# Patient Record
Sex: MALE | Race: BLACK OR AFRICAN AMERICAN | Employment: OTHER | URBAN - METROPOLITAN AREA
[De-identification: names, ages, dates, MRNs, and addresses within clinical notes are randomized per-mention and may not be internally consistent; named-entity substitution may affect disease eponyms.]

---

## 2017-09-11 ENCOUNTER — OFFICE VISIT (OUTPATIENT)
Dept: INTERNAL MEDICINE CLINIC | Age: 77
End: 2017-09-11

## 2017-09-11 VITALS
SYSTOLIC BLOOD PRESSURE: 160 MMHG | HEART RATE: 72 BPM | BODY MASS INDEX: 30.2 KG/M2 | DIASTOLIC BLOOD PRESSURE: 108 MMHG | HEIGHT: 72 IN | WEIGHT: 223 LBS

## 2017-09-11 DIAGNOSIS — I10 ESSENTIAL HYPERTENSION: ICD-10-CM

## 2017-09-11 DIAGNOSIS — Z12.11 ENCOUNTER FOR SCREENING COLONOSCOPY: ICD-10-CM

## 2017-09-11 DIAGNOSIS — I10 ESSENTIAL HYPERTENSION: Primary | ICD-10-CM

## 2017-09-11 DIAGNOSIS — N40.0 BENIGN PROSTATIC HYPERPLASIA WITHOUT LOWER URINARY TRACT SYMPTOMS, UNSPECIFIED MORPHOLOGY: ICD-10-CM

## 2017-09-11 LAB
ANION GAP SERPL CALCULATED.3IONS-SCNC: 13 MMOL/L (ref 3–16)
BUN BLDV-MCNC: 11 MG/DL (ref 7–20)
CALCIUM SERPL-MCNC: 9.5 MG/DL (ref 8.3–10.6)
CHLORIDE BLD-SCNC: 99 MMOL/L (ref 99–110)
CHOLESTEROL, TOTAL: 201 MG/DL (ref 0–199)
CO2: 30 MMOL/L (ref 21–32)
CREAT SERPL-MCNC: 1 MG/DL (ref 0.8–1.3)
GFR AFRICAN AMERICAN: >60
GFR NON-AFRICAN AMERICAN: >60
GLUCOSE BLD-MCNC: 125 MG/DL (ref 70–99)
HCT VFR BLD CALC: 47.4 % (ref 40.5–52.5)
HDLC SERPL-MCNC: 61 MG/DL (ref 40–60)
HEMOGLOBIN: 15.5 G/DL (ref 13.5–17.5)
LDL CHOLESTEROL CALCULATED: 127 MG/DL
MCH RBC QN AUTO: 29.3 PG (ref 26–34)
MCHC RBC AUTO-ENTMCNC: 32.7 G/DL (ref 31–36)
MCV RBC AUTO: 89.5 FL (ref 80–100)
PDW BLD-RTO: 13.5 % (ref 12.4–15.4)
PLATELET # BLD: 193 K/UL (ref 135–450)
PMV BLD AUTO: 8.9 FL (ref 5–10.5)
POTASSIUM SERPL-SCNC: 4.6 MMOL/L (ref 3.5–5.1)
RBC # BLD: 5.29 M/UL (ref 4.2–5.9)
SODIUM BLD-SCNC: 142 MMOL/L (ref 136–145)
TRIGL SERPL-MCNC: 67 MG/DL (ref 0–150)
TSH REFLEX: 1.13 UIU/ML (ref 0.27–4.2)
VLDLC SERPL CALC-MCNC: 13 MG/DL
WBC # BLD: 3.9 K/UL (ref 4–11)

## 2017-09-11 PROCEDURE — 99203 OFFICE O/P NEW LOW 30 MIN: CPT | Performed by: INTERNAL MEDICINE

## 2017-09-11 RX ORDER — HYDROCHLOROTHIAZIDE 25 MG/1
25 TABLET ORAL DAILY
Qty: 30 TABLET | Refills: 3 | Status: SHIPPED | OUTPATIENT
Start: 2017-09-11 | End: 2018-01-25 | Stop reason: SDUPTHER

## 2017-09-11 ASSESSMENT — ENCOUNTER SYMPTOMS
CONSTIPATION: 0
SHORTNESS OF BREATH: 0
COUGH: 0
CHEST TIGHTNESS: 0
ABDOMINAL PAIN: 0
EYE PAIN: 0
VOMITING: 0
TROUBLE SWALLOWING: 0
COLOR CHANGE: 0
VOICE CHANGE: 0
EYE DISCHARGE: 0
NAUSEA: 0
WHEEZING: 0

## 2018-01-25 DIAGNOSIS — I10 ESSENTIAL HYPERTENSION: ICD-10-CM

## 2018-01-25 RX ORDER — HYDROCHLOROTHIAZIDE 25 MG/1
TABLET ORAL
Qty: 30 TABLET | Refills: 2 | Status: SHIPPED | OUTPATIENT
Start: 2018-01-25 | End: 2018-03-19 | Stop reason: DRUGHIGH

## 2018-03-19 ENCOUNTER — OFFICE VISIT (OUTPATIENT)
Dept: INTERNAL MEDICINE CLINIC | Age: 78
End: 2018-03-19

## 2018-03-19 VITALS
SYSTOLIC BLOOD PRESSURE: 146 MMHG | WEIGHT: 222 LBS | HEIGHT: 72 IN | HEART RATE: 72 BPM | DIASTOLIC BLOOD PRESSURE: 100 MMHG | BODY MASS INDEX: 30.07 KG/M2

## 2018-03-19 DIAGNOSIS — I10 ESSENTIAL HYPERTENSION: Primary | ICD-10-CM

## 2018-03-19 DIAGNOSIS — E78.5 DYSLIPIDEMIA: ICD-10-CM

## 2018-03-19 DIAGNOSIS — R73.9 HYPERGLYCEMIA: ICD-10-CM

## 2018-03-19 PROCEDURE — G8484 FLU IMMUNIZE NO ADMIN: HCPCS | Performed by: INTERNAL MEDICINE

## 2018-03-19 PROCEDURE — 1036F TOBACCO NON-USER: CPT | Performed by: INTERNAL MEDICINE

## 2018-03-19 PROCEDURE — 99213 OFFICE O/P EST LOW 20 MIN: CPT | Performed by: INTERNAL MEDICINE

## 2018-03-19 PROCEDURE — 4040F PNEUMOC VAC/ADMIN/RCVD: CPT | Performed by: INTERNAL MEDICINE

## 2018-03-19 PROCEDURE — G8427 DOCREV CUR MEDS BY ELIG CLIN: HCPCS | Performed by: INTERNAL MEDICINE

## 2018-03-19 PROCEDURE — 1123F ACP DISCUSS/DSCN MKR DOCD: CPT | Performed by: INTERNAL MEDICINE

## 2018-03-19 PROCEDURE — G8417 CALC BMI ABV UP PARAM F/U: HCPCS | Performed by: INTERNAL MEDICINE

## 2018-03-19 RX ORDER — LOSARTAN POTASSIUM AND HYDROCHLOROTHIAZIDE 12.5; 5 MG/1; MG/1
1 TABLET ORAL DAILY
Qty: 90 TABLET | Refills: 0 | Status: SHIPPED | OUTPATIENT
Start: 2018-03-19 | End: 2018-12-19 | Stop reason: DRUGHIGH

## 2018-03-19 ASSESSMENT — ENCOUNTER SYMPTOMS
NAUSEA: 0
VOMITING: 0
WHEEZING: 0
ABDOMINAL PAIN: 0
SHORTNESS OF BREATH: 0

## 2018-03-19 NOTE — PROGRESS NOTES
well-nourished. Eyes: Conjunctivae are normal. No scleral icterus. Cardiovascular: Normal rate, regular rhythm and normal heart sounds. Pulmonary/Chest: Effort normal and breath sounds normal. No respiratory distress. He has no wheezes. Musculoskeletal: He exhibits no edema. Neurological: He is alert and oriented to person, place, and time. Psychiatric: He has a normal mood and affect. Thought content normal.   Nursing note and vitals reviewed. CBC:   Lab Results   Component Value Date    WBC 3.9 09/11/2017    HGB 15.5 09/11/2017    HCT 47.4 09/11/2017     09/11/2017     CMP:   Lab Results   Component Value Date     09/11/2017    K 4.6 09/11/2017    CL 99 09/11/2017    CO2 30 09/11/2017    ANIONGAP 13 09/11/2017    GLUCOSE 125 09/11/2017    BUN 11 09/11/2017    CREATININE 1.0 09/11/2017    GFRAA >60 09/11/2017    CALCIUM 9.5 09/11/2017     URINALYSIS:   Lab Results   Component Value Date    GLUCOSEU NEGATIVE 06/15/2013    KETUA NEGATIVE 06/15/2013    SPECGRAV 1.015 06/15/2013    BLOODU NEGATIVE 06/15/2013    PHUR 6.5 06/15/2013    PROTEINU NEGATIVE 06/15/2013    NITRU NEGATIVE 06/15/2013    LEUKOCYTESUR NEGATIVE 06/15/2013   LIPID:   Lab Results   Component Value Date    CHOL 201 09/11/2017    TRIG 67 09/11/2017    HDL 61 09/11/2017    LDLCALC 127 09/11/2017    LABVLDL 13 09/11/2017     TSH:   Lab Results   Component Value Date    TSHREFLEX 1.13 09/11/2017         ASSESSMENT/PLAN:    Del Yap was seen today for 6 month follow-up and hypertension. Diagnoses and all orders for this visit:    Essential hypertension  -     BASIC METABOLIC PANEL; Future  add-     losartan-hydrochlorothiazide (HYZAAR) 50-12.5 MG per tablet; Take 1 tablet by mouth daily    Dyslipidemia  -     LIPID PANEL; Future  The patient is asked to make an attempt to improve diet and exercise patterns to aid in medical management of this problem.     Hyperglycemia  -     HEMOGLOBIN A1C; Future    Patient has been

## 2018-03-20 DIAGNOSIS — E78.5 DYSLIPIDEMIA: ICD-10-CM

## 2018-03-20 DIAGNOSIS — R73.9 HYPERGLYCEMIA: ICD-10-CM

## 2018-03-20 DIAGNOSIS — I10 ESSENTIAL HYPERTENSION: ICD-10-CM

## 2018-03-20 LAB
ANION GAP SERPL CALCULATED.3IONS-SCNC: 13 MMOL/L (ref 3–16)
BUN BLDV-MCNC: 9 MG/DL (ref 7–20)
CALCIUM SERPL-MCNC: 9.2 MG/DL (ref 8.3–10.6)
CHLORIDE BLD-SCNC: 96 MMOL/L (ref 99–110)
CHOLESTEROL, TOTAL: 205 MG/DL (ref 0–199)
CO2: 33 MMOL/L (ref 21–32)
CREAT SERPL-MCNC: 1 MG/DL (ref 0.8–1.3)
GFR AFRICAN AMERICAN: >60
GFR NON-AFRICAN AMERICAN: >60
GLUCOSE BLD-MCNC: 126 MG/DL (ref 70–99)
HDLC SERPL-MCNC: 56 MG/DL (ref 40–60)
LDL CHOLESTEROL CALCULATED: 136 MG/DL
POTASSIUM SERPL-SCNC: 4 MMOL/L (ref 3.5–5.1)
SODIUM BLD-SCNC: 142 MMOL/L (ref 136–145)
TRIGL SERPL-MCNC: 65 MG/DL (ref 0–150)
VLDLC SERPL CALC-MCNC: 13 MG/DL

## 2018-03-21 PROBLEM — E11.9 TYPE 2 DIABETES MELLITUS WITHOUT COMPLICATION, WITHOUT LONG-TERM CURRENT USE OF INSULIN (HCC): Status: ACTIVE | Noted: 2018-03-21

## 2018-03-21 LAB
ESTIMATED AVERAGE GLUCOSE: 148.5 MG/DL
HBA1C MFR BLD: 6.8 %

## 2018-12-13 ENCOUNTER — HOSPITAL ENCOUNTER (INPATIENT)
Age: 78
LOS: 1 days | Discharge: HOME | DRG: 176 | End: 2018-12-15
Attending: EMERGENCY MEDICINE | Admitting: INTERNAL MEDICINE
Payer: MEDICARE

## 2018-12-13 ENCOUNTER — APPOINTMENT (OUTPATIENT)
Dept: GENERAL RADIOLOGY | Age: 78
DRG: 176 | End: 2018-12-13
Payer: MEDICARE

## 2018-12-13 DIAGNOSIS — R07.9 ACUTE CHEST PAIN: ICD-10-CM

## 2018-12-13 DIAGNOSIS — I26.99 PULMONARY EMBOLISM AND INFARCTION (HCC): Primary | ICD-10-CM

## 2018-12-13 DIAGNOSIS — R94.31 ABNORMAL EKG: ICD-10-CM

## 2018-12-13 LAB
A/G RATIO: 1.1 (ref 1.1–2.2)
ALBUMIN SERPL-MCNC: 4.1 G/DL (ref 3.4–5)
ALP BLD-CCNC: 39 U/L (ref 40–129)
ALT SERPL-CCNC: 8 U/L (ref 10–40)
ANION GAP SERPL CALCULATED.3IONS-SCNC: 10 MMOL/L (ref 3–16)
AST SERPL-CCNC: 15 U/L (ref 15–37)
BASOPHILS ABSOLUTE: 0.1 K/UL (ref 0–0.2)
BASOPHILS RELATIVE PERCENT: 0.7 %
BILIRUB SERPL-MCNC: 0.5 MG/DL (ref 0–1)
BUN BLDV-MCNC: 13 MG/DL (ref 7–20)
CALCIUM SERPL-MCNC: 9.5 MG/DL (ref 8.3–10.6)
CHLORIDE BLD-SCNC: 98 MMOL/L (ref 99–110)
CO2: 29 MMOL/L (ref 21–32)
CREAT SERPL-MCNC: 1 MG/DL (ref 0.8–1.3)
EOSINOPHILS ABSOLUTE: 0.2 K/UL (ref 0–0.6)
EOSINOPHILS RELATIVE PERCENT: 2.3 %
GFR AFRICAN AMERICAN: >60
GFR NON-AFRICAN AMERICAN: >60
GLOBULIN: 3.6 G/DL
GLUCOSE BLD-MCNC: 150 MG/DL (ref 70–99)
HCT VFR BLD CALC: 43.4 % (ref 40.5–52.5)
HEMOGLOBIN: 14.3 G/DL (ref 13.5–17.5)
LYMPHOCYTES ABSOLUTE: 1.5 K/UL (ref 1–5.1)
LYMPHOCYTES RELATIVE PERCENT: 22.6 %
MCH RBC QN AUTO: 29.1 PG (ref 26–34)
MCHC RBC AUTO-ENTMCNC: 33.1 G/DL (ref 31–36)
MCV RBC AUTO: 87.9 FL (ref 80–100)
MONOCYTES ABSOLUTE: 0.8 K/UL (ref 0–1.3)
MONOCYTES RELATIVE PERCENT: 11.1 %
NEUTROPHILS ABSOLUTE: 4.3 K/UL (ref 1.7–7.7)
NEUTROPHILS RELATIVE PERCENT: 63.3 %
PDW BLD-RTO: 13.7 % (ref 12.4–15.4)
PLATELET # BLD: 175 K/UL (ref 135–450)
PMV BLD AUTO: 8.1 FL (ref 5–10.5)
POTASSIUM SERPL-SCNC: 4.1 MMOL/L (ref 3.5–5.1)
RBC # BLD: 4.93 M/UL (ref 4.2–5.9)
SODIUM BLD-SCNC: 137 MMOL/L (ref 136–145)
TOTAL PROTEIN: 7.7 G/DL (ref 6.4–8.2)
TROPONIN: <0.01 NG/ML
WBC # BLD: 6.8 K/UL (ref 4–11)

## 2018-12-13 PROCEDURE — 93005 ELECTROCARDIOGRAM TRACING: CPT | Performed by: EMERGENCY MEDICINE

## 2018-12-13 PROCEDURE — 71046 X-RAY EXAM CHEST 2 VIEWS: CPT

## 2018-12-13 PROCEDURE — 85025 COMPLETE CBC W/AUTO DIFF WBC: CPT

## 2018-12-13 PROCEDURE — 84484 ASSAY OF TROPONIN QUANT: CPT

## 2018-12-13 PROCEDURE — 80053 COMPREHEN METABOLIC PANEL: CPT

## 2018-12-13 PROCEDURE — 99285 EMERGENCY DEPT VISIT HI MDM: CPT

## 2018-12-13 RX ORDER — ASPIRIN 81 MG/1
324 TABLET, CHEWABLE ORAL ONCE
Status: COMPLETED | OUTPATIENT
Start: 2018-12-14 | End: 2018-12-14

## 2018-12-14 ENCOUNTER — APPOINTMENT (OUTPATIENT)
Dept: CT IMAGING | Age: 78
DRG: 176 | End: 2018-12-14
Payer: MEDICARE

## 2018-12-14 PROBLEM — R07.9 CHEST PAIN: Status: ACTIVE | Noted: 2018-12-14

## 2018-12-14 PROBLEM — I26.99 BILATERAL PULMONARY EMBOLISM (HCC): Status: ACTIVE | Noted: 2018-12-14

## 2018-12-14 LAB
EKG ATRIAL RATE: 79 BPM
EKG DIAGNOSIS: NORMAL
EKG P AXIS: 71 DEGREES
EKG P-R INTERVAL: 304 MS
EKG Q-T INTERVAL: 362 MS
EKG QRS DURATION: 76 MS
EKG QTC CALCULATION (BAZETT): 415 MS
EKG R AXIS: -52 DEGREES
EKG T AXIS: 109 DEGREES
EKG VENTRICULAR RATE: 79 BPM
INR BLD: 1.24 (ref 0.86–1.14)
LEFT VENTRICULAR EJECTION FRACTION HIGH VALUE: 50 %
LEFT VENTRICULAR EJECTION FRACTION MODE: NORMAL
LV EF: 50 %
LVEF MODALITY: NORMAL
PROTHROMBIN TIME: 14.1 SEC (ref 9.8–13)

## 2018-12-14 PROCEDURE — 85610 PROTHROMBIN TIME: CPT

## 2018-12-14 PROCEDURE — 74177 CT ABD & PELVIS W/CONTRAST: CPT

## 2018-12-14 PROCEDURE — 6360000004 HC RX CONTRAST MEDICATION: Performed by: EMERGENCY MEDICINE

## 2018-12-14 PROCEDURE — 6360000004 HC RX CONTRAST MEDICATION: Performed by: INTERNAL MEDICINE

## 2018-12-14 PROCEDURE — 6370000000 HC RX 637 (ALT 250 FOR IP): Performed by: NURSE PRACTITIONER

## 2018-12-14 PROCEDURE — 2140000000 HC CCU INTERMEDIATE R&B

## 2018-12-14 PROCEDURE — 6370000000 HC RX 637 (ALT 250 FOR IP): Performed by: EMERGENCY MEDICINE

## 2018-12-14 PROCEDURE — 93306 TTE W/DOPPLER COMPLETE: CPT

## 2018-12-14 PROCEDURE — 6360000004 HC RX CONTRAST MEDICATION

## 2018-12-14 PROCEDURE — 2580000003 HC RX 258: Performed by: INTERNAL MEDICINE

## 2018-12-14 PROCEDURE — 6370000000 HC RX 637 (ALT 250 FOR IP): Performed by: INTERNAL MEDICINE

## 2018-12-14 PROCEDURE — 96372 THER/PROPH/DIAG INJ SC/IM: CPT

## 2018-12-14 PROCEDURE — G0378 HOSPITAL OBSERVATION PER HR: HCPCS

## 2018-12-14 PROCEDURE — 93970 EXTREMITY STUDY: CPT

## 2018-12-14 PROCEDURE — 93010 ELECTROCARDIOGRAM REPORT: CPT | Performed by: INTERNAL MEDICINE

## 2018-12-14 PROCEDURE — 6360000002 HC RX W HCPCS: Performed by: INTERNAL MEDICINE

## 2018-12-14 PROCEDURE — 71260 CT THORAX DX C+: CPT

## 2018-12-14 RX ORDER — SODIUM CHLORIDE 0.9 % (FLUSH) 0.9 %
10 SYRINGE (ML) INJECTION EVERY 12 HOURS SCHEDULED
Status: DISCONTINUED | OUTPATIENT
Start: 2018-12-14 | End: 2018-12-14 | Stop reason: SDUPTHER

## 2018-12-14 RX ORDER — POTASSIUM CHLORIDE 7.45 MG/ML
10 INJECTION INTRAVENOUS PRN
Status: DISCONTINUED | OUTPATIENT
Start: 2018-12-14 | End: 2018-12-15 | Stop reason: HOSPADM

## 2018-12-14 RX ORDER — NITROGLYCERIN 0.4 MG/1
0.4 TABLET SUBLINGUAL EVERY 5 MIN PRN
Status: DISCONTINUED | OUTPATIENT
Start: 2018-12-14 | End: 2018-12-15 | Stop reason: HOSPADM

## 2018-12-14 RX ORDER — LOSARTAN POTASSIUM AND HYDROCHLOROTHIAZIDE 12.5; 5 MG/1; MG/1
1 TABLET ORAL DAILY
Status: CANCELLED | OUTPATIENT
Start: 2018-12-14

## 2018-12-14 RX ORDER — WARFARIN SODIUM 5 MG/1
5 TABLET ORAL DAILY
Status: DISCONTINUED | OUTPATIENT
Start: 2018-12-14 | End: 2018-12-14

## 2018-12-14 RX ORDER — ONDANSETRON 2 MG/ML
4 INJECTION INTRAMUSCULAR; INTRAVENOUS EVERY 6 HOURS PRN
Status: DISCONTINUED | OUTPATIENT
Start: 2018-12-14 | End: 2018-12-14 | Stop reason: SDUPTHER

## 2018-12-14 RX ORDER — SODIUM CHLORIDE 0.9 % (FLUSH) 0.9 %
10 SYRINGE (ML) INJECTION PRN
Status: DISCONTINUED | OUTPATIENT
Start: 2018-12-14 | End: 2018-12-15 | Stop reason: HOSPADM

## 2018-12-14 RX ORDER — SODIUM CHLORIDE 0.9 % (FLUSH) 0.9 %
10 SYRINGE (ML) INJECTION EVERY 12 HOURS SCHEDULED
Status: DISCONTINUED | OUTPATIENT
Start: 2018-12-14 | End: 2018-12-15 | Stop reason: HOSPADM

## 2018-12-14 RX ORDER — MAGNESIUM SULFATE 1 G/100ML
1 INJECTION INTRAVENOUS PRN
Status: DISCONTINUED | OUTPATIENT
Start: 2018-12-14 | End: 2018-12-15 | Stop reason: HOSPADM

## 2018-12-14 RX ORDER — ATORVASTATIN CALCIUM 80 MG/1
40 TABLET, FILM COATED ORAL NIGHTLY
Status: DISCONTINUED | OUTPATIENT
Start: 2018-12-14 | End: 2018-12-15 | Stop reason: HOSPADM

## 2018-12-14 RX ORDER — ASPIRIN 81 MG/1
81 TABLET, CHEWABLE ORAL DAILY
Status: DISCONTINUED | OUTPATIENT
Start: 2018-12-15 | End: 2018-12-15 | Stop reason: HOSPADM

## 2018-12-14 RX ORDER — SODIUM CHLORIDE 0.9 % (FLUSH) 0.9 %
10 SYRINGE (ML) INJECTION PRN
Status: DISCONTINUED | OUTPATIENT
Start: 2018-12-14 | End: 2018-12-14 | Stop reason: SDUPTHER

## 2018-12-14 RX ORDER — ONDANSETRON 2 MG/ML
4 INJECTION INTRAMUSCULAR; INTRAVENOUS EVERY 6 HOURS PRN
Status: DISCONTINUED | OUTPATIENT
Start: 2018-12-14 | End: 2018-12-15 | Stop reason: HOSPADM

## 2018-12-14 RX ADMIN — RIVAROXABAN 15 MG: 15 TABLET, FILM COATED ORAL at 10:15

## 2018-12-14 RX ADMIN — IOPAMIDOL 75 ML: 755 INJECTION, SOLUTION INTRAVENOUS at 00:49

## 2018-12-14 RX ADMIN — RIVAROXABAN 15 MG: 15 TABLET, FILM COATED ORAL at 18:55

## 2018-12-14 RX ADMIN — ASPIRIN 81 MG 324 MG: 81 TABLET ORAL at 00:22

## 2018-12-14 RX ADMIN — IOPAMIDOL 75 ML: 755 INJECTION, SOLUTION INTRAVENOUS at 13:49

## 2018-12-14 RX ADMIN — IOHEXOL 50 ML: 240 INJECTION, SOLUTION INTRATHECAL; INTRAVASCULAR; INTRAVENOUS; ORAL at 11:47

## 2018-12-14 RX ADMIN — Medication 10 ML: at 08:12

## 2018-12-14 RX ADMIN — ENOXAPARIN SODIUM 100 MG: 100 INJECTION SUBCUTANEOUS at 02:49

## 2018-12-14 RX ADMIN — Medication 10 ML: at 19:41

## 2018-12-14 RX ADMIN — ATORVASTATIN CALCIUM 40 MG: 80 TABLET, FILM COATED ORAL at 19:40

## 2018-12-14 RX ADMIN — NITROGLYCERIN 1 INCH: 20 OINTMENT TOPICAL at 00:22

## 2018-12-14 ASSESSMENT — ENCOUNTER SYMPTOMS
COLOR CHANGE: 0
DIARRHEA: 0
COUGH: 0
STRIDOR: 0
CONSTIPATION: 0
WHEEZING: 0
BACK PAIN: 1
ABDOMINAL PAIN: 0
ABDOMINAL DISTENTION: 0
VOMITING: 0
NAUSEA: 0
SHORTNESS OF BREATH: 0

## 2018-12-14 ASSESSMENT — PAIN SCALES - GENERAL
PAINLEVEL_OUTOF10: 2
PAINLEVEL_OUTOF10: 0
PAINLEVEL_OUTOF10: 2
PAINLEVEL_OUTOF10: 0

## 2018-12-14 ASSESSMENT — PAIN DESCRIPTION - PAIN TYPE
TYPE: ACUTE PAIN
TYPE: ACUTE PAIN

## 2018-12-14 ASSESSMENT — PAIN DESCRIPTION - LOCATION: LOCATION: OTHER (COMMENT)

## 2018-12-15 VITALS
DIASTOLIC BLOOD PRESSURE: 89 MMHG | WEIGHT: 209 LBS | RESPIRATION RATE: 16 BRPM | BODY MASS INDEX: 29.26 KG/M2 | OXYGEN SATURATION: 95 % | HEART RATE: 101 BPM | SYSTOLIC BLOOD PRESSURE: 155 MMHG | TEMPERATURE: 98.4 F | HEIGHT: 71 IN

## 2018-12-15 LAB
ALBUMIN SERPL-MCNC: 3.5 G/DL (ref 3.4–5)
ANION GAP SERPL CALCULATED.3IONS-SCNC: 6 MMOL/L (ref 3–16)
BASOPHILS ABSOLUTE: 0.1 K/UL (ref 0–0.2)
BASOPHILS RELATIVE PERCENT: 1.2 %
BUN BLDV-MCNC: 8 MG/DL (ref 7–20)
CALCIUM SERPL-MCNC: 8.9 MG/DL (ref 8.3–10.6)
CHLORIDE BLD-SCNC: 101 MMOL/L (ref 99–110)
CO2: 30 MMOL/L (ref 21–32)
CREAT SERPL-MCNC: 0.8 MG/DL (ref 0.8–1.3)
EOSINOPHILS ABSOLUTE: 0.2 K/UL (ref 0–0.6)
EOSINOPHILS RELATIVE PERCENT: 3.4 %
GFR AFRICAN AMERICAN: >60
GFR NON-AFRICAN AMERICAN: >60
GLUCOSE BLD-MCNC: 106 MG/DL (ref 70–99)
HCT VFR BLD CALC: 40.6 % (ref 40.5–52.5)
HEMOGLOBIN: 13.4 G/DL (ref 13.5–17.5)
INR BLD: 1.62 (ref 0.86–1.14)
LYMPHOCYTES ABSOLUTE: 1.2 K/UL (ref 1–5.1)
LYMPHOCYTES RELATIVE PERCENT: 24.7 %
MAGNESIUM: 2.1 MG/DL (ref 1.8–2.4)
MCH RBC QN AUTO: 28.9 PG (ref 26–34)
MCHC RBC AUTO-ENTMCNC: 33.1 G/DL (ref 31–36)
MCV RBC AUTO: 87.3 FL (ref 80–100)
MONOCYTES ABSOLUTE: 0.5 K/UL (ref 0–1.3)
MONOCYTES RELATIVE PERCENT: 10.6 %
NEUTROPHILS ABSOLUTE: 3 K/UL (ref 1.7–7.7)
NEUTROPHILS RELATIVE PERCENT: 60.1 %
PDW BLD-RTO: 13.3 % (ref 12.4–15.4)
PHOSPHORUS: 2.4 MG/DL (ref 2.5–4.9)
PLATELET # BLD: 179 K/UL (ref 135–450)
PMV BLD AUTO: 8.2 FL (ref 5–10.5)
POTASSIUM SERPL-SCNC: 3.8 MMOL/L (ref 3.5–5.1)
PROTHROMBIN TIME: 18.5 SEC (ref 9.8–13)
RBC # BLD: 4.65 M/UL (ref 4.2–5.9)
SODIUM BLD-SCNC: 137 MMOL/L (ref 136–145)
WBC # BLD: 5 K/UL (ref 4–11)

## 2018-12-15 PROCEDURE — 85610 PROTHROMBIN TIME: CPT

## 2018-12-15 PROCEDURE — 83735 ASSAY OF MAGNESIUM: CPT

## 2018-12-15 PROCEDURE — 85025 COMPLETE CBC W/AUTO DIFF WBC: CPT

## 2018-12-15 PROCEDURE — G0378 HOSPITAL OBSERVATION PER HR: HCPCS

## 2018-12-15 PROCEDURE — 6370000000 HC RX 637 (ALT 250 FOR IP): Performed by: INTERNAL MEDICINE

## 2018-12-15 PROCEDURE — 99223 1ST HOSP IP/OBS HIGH 75: CPT | Performed by: INTERNAL MEDICINE

## 2018-12-15 PROCEDURE — 6370000000 HC RX 637 (ALT 250 FOR IP): Performed by: NURSE PRACTITIONER

## 2018-12-15 PROCEDURE — 80069 RENAL FUNCTION PANEL: CPT

## 2018-12-15 PROCEDURE — 2580000003 HC RX 258: Performed by: INTERNAL MEDICINE

## 2018-12-15 RX ADMIN — ASPIRIN 81 MG 81 MG: 81 TABLET ORAL at 08:45

## 2018-12-15 RX ADMIN — RIVAROXABAN 15 MG: 15 TABLET, FILM COATED ORAL at 08:45

## 2018-12-15 RX ADMIN — Medication 10 ML: at 08:45

## 2018-12-15 ASSESSMENT — PAIN SCALES - GENERAL
PAINLEVEL_OUTOF10: 0
PAINLEVEL_OUTOF10: 0

## 2018-12-17 ENCOUNTER — TELEPHONE (OUTPATIENT)
Dept: PULMONOLOGY | Age: 78
End: 2018-12-17

## 2018-12-19 ENCOUNTER — OFFICE VISIT (OUTPATIENT)
Dept: INTERNAL MEDICINE CLINIC | Age: 78
End: 2018-12-19
Payer: MEDICARE

## 2018-12-19 VITALS
SYSTOLIC BLOOD PRESSURE: 136 MMHG | BODY MASS INDEX: 29.96 KG/M2 | DIASTOLIC BLOOD PRESSURE: 84 MMHG | HEIGHT: 71 IN | WEIGHT: 214 LBS | HEART RATE: 72 BPM

## 2018-12-19 DIAGNOSIS — I26.99 BILATERAL PULMONARY EMBOLISM (HCC): ICD-10-CM

## 2018-12-19 DIAGNOSIS — I10 ESSENTIAL HYPERTENSION: ICD-10-CM

## 2018-12-19 DIAGNOSIS — E11.9 TYPE 2 DIABETES MELLITUS WITHOUT COMPLICATION, WITHOUT LONG-TERM CURRENT USE OF INSULIN (HCC): ICD-10-CM

## 2018-12-19 DIAGNOSIS — Z09 HOSPITAL DISCHARGE FOLLOW-UP: ICD-10-CM

## 2018-12-19 DIAGNOSIS — I26.99 BILATERAL PULMONARY EMBOLISM (HCC): Primary | ICD-10-CM

## 2018-12-19 LAB
ANION GAP SERPL CALCULATED.3IONS-SCNC: 10 MMOL/L (ref 3–16)
BUN BLDV-MCNC: 14 MG/DL (ref 7–20)
CALCIUM SERPL-MCNC: 10.2 MG/DL (ref 8.3–10.6)
CHLORIDE BLD-SCNC: 101 MMOL/L (ref 99–110)
CO2: 31 MMOL/L (ref 21–32)
CREAT SERPL-MCNC: 1.1 MG/DL (ref 0.8–1.3)
GFR AFRICAN AMERICAN: >60
GFR NON-AFRICAN AMERICAN: >60
GLUCOSE BLD-MCNC: 103 MG/DL (ref 70–99)
HCT VFR BLD CALC: 44.3 % (ref 40.5–52.5)
HEMOGLOBIN: 14.5 G/DL (ref 13.5–17.5)
MCH RBC QN AUTO: 28.6 PG (ref 26–34)
MCHC RBC AUTO-ENTMCNC: 32.8 G/DL (ref 31–36)
MCV RBC AUTO: 87.3 FL (ref 80–100)
PDW BLD-RTO: 13.5 % (ref 12.4–15.4)
PLATELET # BLD: 261 K/UL (ref 135–450)
PMV BLD AUTO: 8.6 FL (ref 5–10.5)
POTASSIUM SERPL-SCNC: 4.7 MMOL/L (ref 3.5–5.1)
RBC # BLD: 5.07 M/UL (ref 4.2–5.9)
SODIUM BLD-SCNC: 142 MMOL/L (ref 136–145)
WBC # BLD: 4 K/UL (ref 4–11)

## 2018-12-19 PROCEDURE — 99214 OFFICE O/P EST MOD 30 MIN: CPT | Performed by: INTERNAL MEDICINE

## 2018-12-19 PROCEDURE — 1111F DSCHRG MED/CURRENT MED MERGE: CPT | Performed by: INTERNAL MEDICINE

## 2018-12-19 RX ORDER — VITAMIN E 268 MG
400 CAPSULE ORAL DAILY
COMMUNITY
End: 2021-01-07 | Stop reason: ALTCHOICE

## 2018-12-19 RX ORDER — MAGNESIUM OXIDE 400 MG/1
400 TABLET ORAL DAILY
COMMUNITY
End: 2021-03-12

## 2018-12-19 RX ORDER — VITAMIN B COMPLEX
TABLET ORAL DAILY
COMMUNITY
End: 2021-01-07 | Stop reason: ALTCHOICE

## 2018-12-19 RX ORDER — WITCH HAZEL 50 %
PADS, MEDICATED (EA) TOPICAL 2 TIMES DAILY
COMMUNITY
End: 2021-01-07 | Stop reason: ALTCHOICE

## 2018-12-19 RX ORDER — LOSARTAN POTASSIUM AND HYDROCHLOROTHIAZIDE 12.5; 5 MG/1; MG/1
0.5 TABLET ORAL DAILY
Qty: 45 TABLET | Refills: 1 | Status: SHIPPED | OUTPATIENT
Start: 2018-12-19 | End: 2019-07-29 | Stop reason: SDUPTHER

## 2018-12-19 RX ORDER — MULTIVIT WITH MINERALS/LUTEIN
2000 TABLET ORAL 2 TIMES DAILY
COMMUNITY
End: 2021-01-07 | Stop reason: ALTCHOICE

## 2018-12-19 RX ORDER — M-VIT,TX,IRON,MINS/CALC/FOLIC 27MG-0.4MG
1 TABLET ORAL DAILY
COMMUNITY
End: 2021-07-13 | Stop reason: ALTCHOICE

## 2018-12-19 RX ORDER — LOSARTAN POTASSIUM AND HYDROCHLOROTHIAZIDE 12.5; 5 MG/1; MG/1
0.5 TABLET ORAL DAILY
Qty: 45 TABLET | Refills: 0 | Status: SHIPPED | OUTPATIENT
Start: 2018-12-19 | End: 2018-12-19 | Stop reason: SDUPTHER

## 2018-12-19 ASSESSMENT — PATIENT HEALTH QUESTIONNAIRE - PHQ9
1. LITTLE INTEREST OR PLEASURE IN DOING THINGS: 0
SUM OF ALL RESPONSES TO PHQ QUESTIONS 1-9: 0
2. FEELING DOWN, DEPRESSED OR HOPELESS: 0
SUM OF ALL RESPONSES TO PHQ9 QUESTIONS 1 & 2: 0
SUM OF ALL RESPONSES TO PHQ QUESTIONS 1-9: 0

## 2018-12-20 LAB
ESTIMATED AVERAGE GLUCOSE: 137 MG/DL
HBA1C MFR BLD: 6.4 %

## 2019-01-11 ENCOUNTER — OFFICE VISIT (OUTPATIENT)
Dept: PULMONOLOGY | Age: 79
End: 2019-01-11
Payer: MEDICARE

## 2019-01-11 VITALS
HEIGHT: 72 IN | BODY MASS INDEX: 29.12 KG/M2 | RESPIRATION RATE: 18 BRPM | SYSTOLIC BLOOD PRESSURE: 160 MMHG | WEIGHT: 215 LBS | DIASTOLIC BLOOD PRESSURE: 89 MMHG | HEART RATE: 69 BPM | OXYGEN SATURATION: 96 %

## 2019-01-11 DIAGNOSIS — R93.89 ABNORMAL CT OF THE CHEST: ICD-10-CM

## 2019-01-11 DIAGNOSIS — I26.99 BILATERAL PULMONARY EMBOLISM (HCC): ICD-10-CM

## 2019-01-11 DIAGNOSIS — Z09 HOSPITAL DISCHARGE FOLLOW-UP: Primary | ICD-10-CM

## 2019-01-11 PROCEDURE — 1111F DSCHRG MED/CURRENT MED MERGE: CPT | Performed by: INTERNAL MEDICINE

## 2019-01-11 PROCEDURE — 1123F ACP DISCUSS/DSCN MKR DOCD: CPT | Performed by: INTERNAL MEDICINE

## 2019-01-11 PROCEDURE — G8484 FLU IMMUNIZE NO ADMIN: HCPCS | Performed by: INTERNAL MEDICINE

## 2019-01-11 PROCEDURE — 1101F PT FALLS ASSESS-DOCD LE1/YR: CPT | Performed by: INTERNAL MEDICINE

## 2019-01-11 PROCEDURE — G8417 CALC BMI ABV UP PARAM F/U: HCPCS | Performed by: INTERNAL MEDICINE

## 2019-01-11 PROCEDURE — 4040F PNEUMOC VAC/ADMIN/RCVD: CPT | Performed by: INTERNAL MEDICINE

## 2019-01-11 PROCEDURE — G8427 DOCREV CUR MEDS BY ELIG CLIN: HCPCS | Performed by: INTERNAL MEDICINE

## 2019-01-11 PROCEDURE — 99214 OFFICE O/P EST MOD 30 MIN: CPT | Performed by: INTERNAL MEDICINE

## 2019-01-11 PROCEDURE — 1036F TOBACCO NON-USER: CPT | Performed by: INTERNAL MEDICINE

## 2019-06-12 ENCOUNTER — TELEPHONE (OUTPATIENT)
Dept: PULMONOLOGY | Age: 79
End: 2019-06-12

## 2019-06-12 NOTE — TELEPHONE ENCOUNTER
I spoke with the pt and he stated that he will call and have the CT Chest scheduled then he will call our office to schedule a follow up appt

## 2019-07-01 ENCOUNTER — HOSPITAL ENCOUNTER (OUTPATIENT)
Dept: CT IMAGING | Age: 79
Discharge: HOME | End: 2019-07-01
Payer: MEDICARE

## 2019-07-01 DIAGNOSIS — R93.89 ABNORMAL CT OF THE CHEST: ICD-10-CM

## 2019-07-01 PROCEDURE — 71250 CT THORAX DX C-: CPT

## 2019-07-08 ENCOUNTER — OFFICE VISIT (OUTPATIENT)
Dept: PULMONOLOGY | Age: 79
End: 2019-07-08
Payer: MEDICARE

## 2019-07-08 VITALS
RESPIRATION RATE: 18 BRPM | BODY MASS INDEX: 29.8 KG/M2 | WEIGHT: 220 LBS | HEIGHT: 72 IN | OXYGEN SATURATION: 97 % | DIASTOLIC BLOOD PRESSURE: 95 MMHG | HEART RATE: 66 BPM | SYSTOLIC BLOOD PRESSURE: 179 MMHG

## 2019-07-08 DIAGNOSIS — I10 ESSENTIAL HYPERTENSION: ICD-10-CM

## 2019-07-08 DIAGNOSIS — I26.99 BILATERAL PULMONARY EMBOLISM (HCC): Primary | ICD-10-CM

## 2019-07-08 PROCEDURE — G8427 DOCREV CUR MEDS BY ELIG CLIN: HCPCS | Performed by: INTERNAL MEDICINE

## 2019-07-08 PROCEDURE — 1123F ACP DISCUSS/DSCN MKR DOCD: CPT | Performed by: INTERNAL MEDICINE

## 2019-07-08 PROCEDURE — G8417 CALC BMI ABV UP PARAM F/U: HCPCS | Performed by: INTERNAL MEDICINE

## 2019-07-08 PROCEDURE — 1036F TOBACCO NON-USER: CPT | Performed by: INTERNAL MEDICINE

## 2019-07-08 PROCEDURE — 99213 OFFICE O/P EST LOW 20 MIN: CPT | Performed by: INTERNAL MEDICINE

## 2019-07-08 PROCEDURE — 4040F PNEUMOC VAC/ADMIN/RCVD: CPT | Performed by: INTERNAL MEDICINE

## 2019-11-13 ENCOUNTER — OFFICE VISIT (OUTPATIENT)
Dept: INTERNAL MEDICINE CLINIC | Age: 79
End: 2019-11-13
Payer: MEDICARE

## 2019-11-13 VITALS — SYSTOLIC BLOOD PRESSURE: 138 MMHG | DIASTOLIC BLOOD PRESSURE: 86 MMHG

## 2019-11-13 DIAGNOSIS — Z00.00 ROUTINE PHYSICAL EXAMINATION: Primary | ICD-10-CM

## 2019-11-13 DIAGNOSIS — R73.03 PREDIABETES: ICD-10-CM

## 2019-11-13 DIAGNOSIS — E11.9 TYPE 2 DIABETES MELLITUS WITHOUT COMPLICATION, WITHOUT LONG-TERM CURRENT USE OF INSULIN (HCC): ICD-10-CM

## 2019-11-13 DIAGNOSIS — E78.5 DYSLIPIDEMIA: ICD-10-CM

## 2019-11-13 DIAGNOSIS — I10 ESSENTIAL HYPERTENSION: ICD-10-CM

## 2019-11-13 PROCEDURE — G8484 FLU IMMUNIZE NO ADMIN: HCPCS | Performed by: INTERNAL MEDICINE

## 2019-11-13 PROCEDURE — 99214 OFFICE O/P EST MOD 30 MIN: CPT | Performed by: INTERNAL MEDICINE

## 2019-11-13 PROCEDURE — 1036F TOBACCO NON-USER: CPT | Performed by: INTERNAL MEDICINE

## 2019-11-13 PROCEDURE — G8427 DOCREV CUR MEDS BY ELIG CLIN: HCPCS | Performed by: INTERNAL MEDICINE

## 2019-11-13 PROCEDURE — 1123F ACP DISCUSS/DSCN MKR DOCD: CPT | Performed by: INTERNAL MEDICINE

## 2019-11-13 PROCEDURE — G8417 CALC BMI ABV UP PARAM F/U: HCPCS | Performed by: INTERNAL MEDICINE

## 2019-11-13 PROCEDURE — 4040F PNEUMOC VAC/ADMIN/RCVD: CPT | Performed by: INTERNAL MEDICINE

## 2019-11-13 RX ORDER — LOSARTAN POTASSIUM AND HYDROCHLOROTHIAZIDE 12.5; 5 MG/1; MG/1
TABLET ORAL
Qty: 45 TABLET | Refills: 1 | Status: SHIPPED | OUTPATIENT
Start: 2019-11-13 | End: 2020-06-04 | Stop reason: SDUPTHER

## 2019-11-13 RX ORDER — ZINC GLUCONATE 50 MG
TABLET ORAL
COMMUNITY
End: 2021-01-07 | Stop reason: ALTCHOICE

## 2019-11-13 ASSESSMENT — ENCOUNTER SYMPTOMS
TROUBLE SWALLOWING: 0
SHORTNESS OF BREATH: 0
PHOTOPHOBIA: 0
ABDOMINAL PAIN: 0
VOMITING: 0
NAUSEA: 0
WHEEZING: 0
VOICE CHANGE: 0

## 2019-11-13 ASSESSMENT — PATIENT HEALTH QUESTIONNAIRE - PHQ9
SUM OF ALL RESPONSES TO PHQ QUESTIONS 1-9: 0
1. LITTLE INTEREST OR PLEASURE IN DOING THINGS: 0
SUM OF ALL RESPONSES TO PHQ QUESTIONS 1-9: 0
2. FEELING DOWN, DEPRESSED OR HOPELESS: 0
SUM OF ALL RESPONSES TO PHQ9 QUESTIONS 1 & 2: 0

## 2019-11-15 DIAGNOSIS — R73.03 PREDIABETES: ICD-10-CM

## 2019-11-15 DIAGNOSIS — Z00.00 ROUTINE PHYSICAL EXAMINATION: ICD-10-CM

## 2019-11-15 LAB
A/G RATIO: 1.3 (ref 1.1–2.2)
ALBUMIN SERPL-MCNC: 4.2 G/DL (ref 3.4–5)
ALP BLD-CCNC: 36 U/L (ref 40–129)
ALT SERPL-CCNC: 11 U/L (ref 10–40)
ANION GAP SERPL CALCULATED.3IONS-SCNC: 13 MMOL/L (ref 3–16)
AST SERPL-CCNC: 20 U/L (ref 15–37)
BILIRUB SERPL-MCNC: 0.8 MG/DL (ref 0–1)
BUN BLDV-MCNC: 18 MG/DL (ref 7–20)
CALCIUM SERPL-MCNC: 9.4 MG/DL (ref 8.3–10.6)
CHLORIDE BLD-SCNC: 98 MMOL/L (ref 99–110)
CHOLESTEROL, TOTAL: 192 MG/DL (ref 0–199)
CO2: 29 MMOL/L (ref 21–32)
CREAT SERPL-MCNC: 1.1 MG/DL (ref 0.8–1.3)
GFR AFRICAN AMERICAN: >60
GFR NON-AFRICAN AMERICAN: >60
GLOBULIN: 3.2 G/DL
GLUCOSE BLD-MCNC: 150 MG/DL (ref 70–99)
HCT VFR BLD CALC: 45.3 % (ref 40.5–52.5)
HDLC SERPL-MCNC: 51 MG/DL (ref 40–60)
HEMOGLOBIN: 15 G/DL (ref 13.5–17.5)
LDL CHOLESTEROL CALCULATED: 129 MG/DL
MCH RBC QN AUTO: 29.5 PG (ref 26–34)
MCHC RBC AUTO-ENTMCNC: 33.2 G/DL (ref 31–36)
MCV RBC AUTO: 88.9 FL (ref 80–100)
PDW BLD-RTO: 13.2 % (ref 12.4–15.4)
PLATELET # BLD: 192 K/UL (ref 135–450)
PMV BLD AUTO: 8.9 FL (ref 5–10.5)
POTASSIUM SERPL-SCNC: 3.9 MMOL/L (ref 3.5–5.1)
RBC # BLD: 5.1 M/UL (ref 4.2–5.9)
SODIUM BLD-SCNC: 140 MMOL/L (ref 136–145)
TOTAL PROTEIN: 7.4 G/DL (ref 6.4–8.2)
TRIGL SERPL-MCNC: 61 MG/DL (ref 0–150)
TSH REFLEX: 0.85 UIU/ML (ref 0.27–4.2)
VLDLC SERPL CALC-MCNC: 12 MG/DL
WBC # BLD: 4.2 K/UL (ref 4–11)

## 2019-11-16 LAB
ESTIMATED AVERAGE GLUCOSE: 145.6 MG/DL
HBA1C MFR BLD: 6.7 %

## 2019-11-19 ENCOUNTER — TELEPHONE (OUTPATIENT)
Dept: INTERNAL MEDICINE CLINIC | Age: 79
End: 2019-11-19

## 2019-11-27 ENCOUNTER — OFFICE VISIT (OUTPATIENT)
Dept: INTERNAL MEDICINE CLINIC | Age: 79
End: 2019-11-27
Payer: MEDICARE

## 2019-11-27 VITALS
DIASTOLIC BLOOD PRESSURE: 96 MMHG | HEIGHT: 72 IN | SYSTOLIC BLOOD PRESSURE: 156 MMHG | WEIGHT: 217 LBS | BODY MASS INDEX: 29.39 KG/M2 | HEART RATE: 72 BPM

## 2019-11-27 DIAGNOSIS — E78.5 DYSLIPIDEMIA: ICD-10-CM

## 2019-11-27 DIAGNOSIS — E11.9 TYPE 2 DIABETES MELLITUS WITHOUT COMPLICATION, WITHOUT LONG-TERM CURRENT USE OF INSULIN (HCC): Primary | ICD-10-CM

## 2019-11-27 PROCEDURE — G8484 FLU IMMUNIZE NO ADMIN: HCPCS | Performed by: INTERNAL MEDICINE

## 2019-11-27 PROCEDURE — 99213 OFFICE O/P EST LOW 20 MIN: CPT | Performed by: INTERNAL MEDICINE

## 2019-11-27 PROCEDURE — 1123F ACP DISCUSS/DSCN MKR DOCD: CPT | Performed by: INTERNAL MEDICINE

## 2019-11-27 PROCEDURE — G8417 CALC BMI ABV UP PARAM F/U: HCPCS | Performed by: INTERNAL MEDICINE

## 2019-11-27 PROCEDURE — 1036F TOBACCO NON-USER: CPT | Performed by: INTERNAL MEDICINE

## 2019-11-27 PROCEDURE — G8427 DOCREV CUR MEDS BY ELIG CLIN: HCPCS | Performed by: INTERNAL MEDICINE

## 2019-11-27 PROCEDURE — 4040F PNEUMOC VAC/ADMIN/RCVD: CPT | Performed by: INTERNAL MEDICINE

## 2019-11-27 RX ORDER — METFORMIN HYDROCHLORIDE 750 MG/1
750 TABLET, EXTENDED RELEASE ORAL
Qty: 90 TABLET | Refills: 1 | Status: SHIPPED | OUTPATIENT
Start: 2019-11-27 | End: 2020-06-04 | Stop reason: SDUPTHER

## 2019-11-27 RX ORDER — ATORVASTATIN CALCIUM 20 MG/1
20 TABLET, FILM COATED ORAL DAILY
Qty: 90 TABLET | Refills: 1 | Status: SHIPPED | OUTPATIENT
Start: 2019-11-27 | End: 2020-06-04 | Stop reason: SDUPTHER

## 2019-11-27 ASSESSMENT — ENCOUNTER SYMPTOMS
VOICE CHANGE: 0
WHEEZING: 0
NAUSEA: 0
CHEST TIGHTNESS: 0
TROUBLE SWALLOWING: 0
VOMITING: 0
ABDOMINAL PAIN: 0

## 2019-12-16 ENCOUNTER — TELEPHONE (OUTPATIENT)
Dept: INTERNAL MEDICINE CLINIC | Age: 79
End: 2019-12-16

## 2019-12-16 NOTE — TELEPHONE ENCOUNTER
I have reviewed with the  the situation with message. Patient stated that he was told that someone in the office could help with his medication questions. I coached  to check patients appointment desk before sending a message back. I believe this was a misunderstanding of communication between all parties.  But concerns have been addressed with the

## 2019-12-16 NOTE — TELEPHONE ENCOUNTER
Inform patient he will be taking metformin with breakfast.  He can take atorvastatin at night after food. There should be better documentation in chart from , why he was not seen in office today ? .  I see his appointment today for headache evaluation.

## 2020-01-14 ENCOUNTER — TELEPHONE (OUTPATIENT)
Dept: PULMONOLOGY | Age: 80
End: 2020-01-14

## 2020-01-14 NOTE — TELEPHONE ENCOUNTER
Pt stopped in asking for a refill on his XARELTO 10 MG sent to [facility] on [address].      Pt # [phone]

## 2020-02-12 ENCOUNTER — OFFICE VISIT (OUTPATIENT)
Dept: PULMONOLOGY | Age: 80
End: 2020-02-12
Payer: MEDICARE

## 2020-02-12 VITALS — DIASTOLIC BLOOD PRESSURE: 85 MMHG | OXYGEN SATURATION: 99 % | HEART RATE: 65 BPM | SYSTOLIC BLOOD PRESSURE: 137 MMHG

## 2020-02-12 PROCEDURE — G8484 FLU IMMUNIZE NO ADMIN: HCPCS | Performed by: INTERNAL MEDICINE

## 2020-02-12 PROCEDURE — 1036F TOBACCO NON-USER: CPT | Performed by: INTERNAL MEDICINE

## 2020-02-12 PROCEDURE — 99213 OFFICE O/P EST LOW 20 MIN: CPT | Performed by: INTERNAL MEDICINE

## 2020-02-12 PROCEDURE — G8417 CALC BMI ABV UP PARAM F/U: HCPCS | Performed by: INTERNAL MEDICINE

## 2020-02-12 PROCEDURE — G8427 DOCREV CUR MEDS BY ELIG CLIN: HCPCS | Performed by: INTERNAL MEDICINE

## 2020-02-12 PROCEDURE — 4040F PNEUMOC VAC/ADMIN/RCVD: CPT | Performed by: INTERNAL MEDICINE

## 2020-02-12 PROCEDURE — 1123F ACP DISCUSS/DSCN MKR DOCD: CPT | Performed by: INTERNAL MEDICINE

## 2020-02-12 NOTE — PROGRESS NOTES
[facility]  Follow Up Note  Norman Cabrera MD       Juliane Eldridge   YOB: 1940    Date of Visit:  2020    Assessment/Plan:  1. Bilateral pulmonary embolism (HCC)  Discussed options going forward  He stopped Xarelto in favor of Cod Liver Oil (his sister took this and lived to be over [facility] 1560)  We cannot confirm if he had DVT in the past    2. Essential hypertension  Blood pressure was elevated today  He forgot to take his blood pressure medicine this morning    F/U as needed    Chief Complaint   Patient presents with    Shortness of Breath     6 month f.u.       HPI  The patient presents with a chief complaint of bilateral PE. The patient had CT imaging which was a noncontrast study. This did reveal resolution of infiltrate. He stopped taking his Xarelto and has done well  He had taken 6 months worth of Xarelto. He did have a history in the past of possible DVT. However, I do not have any of those records. There is no complaint of chest pain, nausea or vomiting. His initial shortness of breath is resolved. Review of Systems  As reviewed in HPI    History  I have reviewed past medical, surgical, social and family history. This is documented elsewhere in the medical record. Physical Exam:  Well developed, well nourished  Alert and oriented  Sclera is clear  No cervical adenopathy  No JVD. Chest examination is clear. Cardiac examination reveals regular rate and rhythm without murmur, gallop or rub. The abdomen is soft, nontender and nondistended. There is no clubbing, cyanosis or edema of the extremities. There is no obvious skin rash. No focal neuro deficicts  Normal mood and affect    Allergies   Allergen Reactions    No Known Allergies      Prior to Visit Medications    Medication Sig Taking?  Authorizing Provider   atorvastatin (LIPITOR) 20 MG tablet Take 1 tablet by mouth daily  Hanna Wallace MD   metFORMIN (GLUCOPHAGE XR) 750 MG

## 2020-04-24 ENCOUNTER — TELEPHONE (OUTPATIENT)
Dept: PULMONOLOGY | Age: 80
End: 2020-04-24

## 2020-04-24 NOTE — TELEPHONE ENCOUNTER
Pt calling because he was taken off the Xarelto in Feb at his last appointment and has noticed his Right foot and ankle are swollen. Pt afraid it could be a DVT so he started back up on the Xarelto 10 mg yesterday and ?  What he should do

## 2020-04-24 NOTE — TELEPHONE ENCOUNTER
Order placed. Called scheduling to schedule pt. Per scheduling they will call us back with date and time once they have spoken with the Dept.

## 2020-06-04 ENCOUNTER — OFFICE VISIT (OUTPATIENT)
Dept: INTERNAL MEDICINE CLINIC | Age: 80
End: 2020-06-04
Payer: MEDICARE

## 2020-06-04 ENCOUNTER — TELEPHONE (OUTPATIENT)
Dept: INTERNAL MEDICINE CLINIC | Age: 80
End: 2020-06-04

## 2020-06-04 VITALS
SYSTOLIC BLOOD PRESSURE: 142 MMHG | DIASTOLIC BLOOD PRESSURE: 104 MMHG | HEIGHT: 72 IN | BODY MASS INDEX: 26.95 KG/M2 | WEIGHT: 199 LBS | TEMPERATURE: 97.7 F | HEART RATE: 78 BPM

## 2020-06-04 DIAGNOSIS — E11.9 TYPE 2 DIABETES MELLITUS WITHOUT COMPLICATION, WITHOUT LONG-TERM CURRENT USE OF INSULIN (HCC): ICD-10-CM

## 2020-06-04 DIAGNOSIS — K21.9 GASTROESOPHAGEAL REFLUX DISEASE, ESOPHAGITIS PRESENCE NOT SPECIFIED: Primary | ICD-10-CM

## 2020-06-04 DIAGNOSIS — I10 ESSENTIAL HYPERTENSION: ICD-10-CM

## 2020-06-04 LAB
ANION GAP SERPL CALCULATED.3IONS-SCNC: 11 MMOL/L (ref 3–16)
BILIRUBIN URINE: NEGATIVE
BLOOD, URINE: NEGATIVE
BUN BLDV-MCNC: 11 MG/DL (ref 7–20)
CALCIUM SERPL-MCNC: 9.2 MG/DL (ref 8.3–10.6)
CHLORIDE BLD-SCNC: 97 MMOL/L (ref 99–110)
CHOLESTEROL, FASTING: 192 MG/DL (ref 0–199)
CLARITY: CLEAR
CO2: 29 MMOL/L (ref 21–32)
COLOR: YELLOW
CREAT SERPL-MCNC: 0.9 MG/DL (ref 0.8–1.3)
GFR AFRICAN AMERICAN: >60
GFR NON-AFRICAN AMERICAN: >60
GLUCOSE BLD-MCNC: 111 MG/DL (ref 70–99)
GLUCOSE URINE: NEGATIVE MG/DL
HDLC SERPL-MCNC: 62 MG/DL (ref 40–60)
KETONES, URINE: ABNORMAL MG/DL
LDL CHOLESTEROL CALCULATED: 121 MG/DL
LEUKOCYTE ESTERASE, URINE: NEGATIVE
MICROSCOPIC EXAMINATION: ABNORMAL
NITRITE, URINE: NEGATIVE
PH UA: 7.5 (ref 5–8)
POTASSIUM SERPL-SCNC: 4 MMOL/L (ref 3.5–5.1)
PROTEIN UA: NEGATIVE MG/DL
SODIUM BLD-SCNC: 137 MMOL/L (ref 136–145)
SPECIFIC GRAVITY UA: 1.01 (ref 1–1.03)
TRIGLYCERIDE, FASTING: 46 MG/DL (ref 0–150)
URINE TYPE: ABNORMAL
UROBILINOGEN, URINE: 0.2 E.U./DL
VLDLC SERPL CALC-MCNC: 9 MG/DL

## 2020-06-04 PROCEDURE — G8417 CALC BMI ABV UP PARAM F/U: HCPCS | Performed by: INTERNAL MEDICINE

## 2020-06-04 PROCEDURE — 1123F ACP DISCUSS/DSCN MKR DOCD: CPT | Performed by: INTERNAL MEDICINE

## 2020-06-04 PROCEDURE — G8510 SCR DEP NEG, NO PLAN REQD: HCPCS | Performed by: INTERNAL MEDICINE

## 2020-06-04 PROCEDURE — 4040F PNEUMOC VAC/ADMIN/RCVD: CPT | Performed by: INTERNAL MEDICINE

## 2020-06-04 PROCEDURE — G8427 DOCREV CUR MEDS BY ELIG CLIN: HCPCS | Performed by: INTERNAL MEDICINE

## 2020-06-04 PROCEDURE — 1036F TOBACCO NON-USER: CPT | Performed by: INTERNAL MEDICINE

## 2020-06-04 PROCEDURE — 99214 OFFICE O/P EST MOD 30 MIN: CPT | Performed by: INTERNAL MEDICINE

## 2020-06-04 RX ORDER — ATORVASTATIN CALCIUM 20 MG/1
20 TABLET, FILM COATED ORAL DAILY
Qty: 90 TABLET | Refills: 1 | Status: SHIPPED | OUTPATIENT
Start: 2020-06-04 | End: 2020-12-21

## 2020-06-04 RX ORDER — PANTOPRAZOLE SODIUM 40 MG/1
40 TABLET, DELAYED RELEASE ORAL
Qty: 90 TABLET | Refills: 1 | Status: SHIPPED | OUTPATIENT
Start: 2020-06-04 | End: 2020-06-05

## 2020-06-04 RX ORDER — LOSARTAN POTASSIUM AND HYDROCHLOROTHIAZIDE 12.5; 5 MG/1; MG/1
TABLET ORAL
Qty: 45 TABLET | Refills: 1 | Status: SHIPPED | OUTPATIENT
Start: 2020-06-04 | End: 2020-06-08 | Stop reason: SDUPTHER

## 2020-06-04 RX ORDER — METFORMIN HYDROCHLORIDE 750 MG/1
750 TABLET, EXTENDED RELEASE ORAL
Qty: 90 TABLET | Refills: 1 | Status: SHIPPED | OUTPATIENT
Start: 2020-06-04 | End: 2020-11-27

## 2020-06-04 ASSESSMENT — PATIENT HEALTH QUESTIONNAIRE - PHQ9
SUM OF ALL RESPONSES TO PHQ9 QUESTIONS 1 & 2: 0
2. FEELING DOWN, DEPRESSED OR HOPELESS: 0
SUM OF ALL RESPONSES TO PHQ QUESTIONS 1-9: 0
1. LITTLE INTEREST OR PLEASURE IN DOING THINGS: 0
SUM OF ALL RESPONSES TO PHQ QUESTIONS 1-9: 0

## 2020-06-04 ASSESSMENT — ENCOUNTER SYMPTOMS
SHORTNESS OF BREATH: 0
CHEST TIGHTNESS: 0
TROUBLE SWALLOWING: 0
WHEEZING: 0
VOICE CHANGE: 0
VOMITING: 0
COUGH: 0
ABDOMINAL PAIN: 0
NAUSEA: 0
PHOTOPHOBIA: 0

## 2020-06-04 NOTE — PROGRESS NOTES
[address] [place]  1940  male  79 y.o. SUBJECTIVE:       Chief Complaint   Patient presents with    Annual Exam    Weight Loss     down 18#, less restaurant and desserts       HPI:  Diabetes:    [address] returns for follow up of his diabetes. Taking medications compliantly? without noted side effects. Denies any significant symptoms of hypoglycemia. Denies polyuria,polydipsia,blurry vision, chest pain, dyspnea or claudication. No foot burning,numbness or pain. Follows diet fairly well. Home blood glucose monitoring in the range of -does not check. .  Lab Results   Component Value Date    LABA1C 6.7 11/15/2019    LABA1C 6.4 12/19/2018    LABA1C 6.8 03/20/2018     Lab Results   Component Value Date    LDLCALC 129 (H) 11/15/2019    CREATININE 1.1 11/15/2019     Hypertension:    David Ortega returns for follow up of hypertension. Patient is not taking his antihypertensive. Does not check BP at home. No symptoms (denies chest pain,dyspnea,edema or TIA's or blurred vision) concerning for end organ damage are present. Hyperlipidemia:    Patient returns for follow up of hyperlipidemia. Patient has not been taking His medications as prescribed.    Lab Results   Component Value Date    TRIG 61 11/15/2019    HDL 51 11/15/2019    LDLCALC 129 11/15/2019     Lab Results   Component Value Date    ALT 11 11/15/2019    AST 20 11/15/2019                Past Medical History:   Diagnosis Date    DVT (deep venous thrombosis) (HCC)     Dysuria     Hypertension     Prostate enlargement      Past Surgical History:   Procedure Laterality Date    PROSTATE SURGERY       Social History     Socioeconomic History    Marital status:      Spouse name: None    Number of children: 3    Years of education: None    Highest education level: None   Occupational History    Occupation: retired    Social Needs    Financial resource strain: None    Food insecurity     Worry: None     Inability: None   Story

## 2020-06-04 NOTE — TELEPHONE ENCOUNTER
Pt called, said he would like to talk to Dr. Daryle Hamel about his acid reflux. States he's had it for a while but hasn't discussed it with him before. Please call, he said he would come in for an appointment.

## 2020-06-04 NOTE — TELEPHONE ENCOUNTER
Pantoprazole is sent to his pharmacy.   If symptoms does not resolve he should call us back in 2-3 weeks

## 2020-06-05 ENCOUNTER — TELEPHONE (OUTPATIENT)
Dept: INTERNAL MEDICINE CLINIC | Age: 80
End: 2020-06-05

## 2020-06-05 LAB
ESTIMATED AVERAGE GLUCOSE: 119.8 MG/DL
HBA1C MFR BLD: 5.8 %

## 2020-06-08 ENCOUNTER — TELEPHONE (OUTPATIENT)
Dept: INTERNAL MEDICINE CLINIC | Age: 80
End: 2020-06-08

## 2020-06-08 RX ORDER — LOSARTAN POTASSIUM AND HYDROCHLOROTHIAZIDE 12.5; 5 MG/1; MG/1
TABLET ORAL
Qty: 45 TABLET | Refills: 0 | Status: SHIPPED | OUTPATIENT
Start: 2020-06-08 | End: 2020-09-08

## 2020-06-26 ENCOUNTER — OFFICE VISIT (OUTPATIENT)
Dept: INTERNAL MEDICINE CLINIC | Age: 80
End: 2020-06-26
Payer: MEDICARE

## 2020-06-26 ENCOUNTER — TELEPHONE (OUTPATIENT)
Dept: INTERNAL MEDICINE CLINIC | Age: 80
End: 2020-06-26

## 2020-06-26 VITALS
WEIGHT: 197 LBS | HEART RATE: 72 BPM | BODY MASS INDEX: 26.68 KG/M2 | TEMPERATURE: 98.1 F | DIASTOLIC BLOOD PRESSURE: 82 MMHG | HEIGHT: 72 IN | SYSTOLIC BLOOD PRESSURE: 136 MMHG

## 2020-06-26 DIAGNOSIS — E78.5 DYSLIPIDEMIA: ICD-10-CM

## 2020-06-26 PROCEDURE — 1036F TOBACCO NON-USER: CPT | Performed by: INTERNAL MEDICINE

## 2020-06-26 PROCEDURE — 1123F ACP DISCUSS/DSCN MKR DOCD: CPT | Performed by: INTERNAL MEDICINE

## 2020-06-26 PROCEDURE — G8417 CALC BMI ABV UP PARAM F/U: HCPCS | Performed by: INTERNAL MEDICINE

## 2020-06-26 PROCEDURE — 4040F PNEUMOC VAC/ADMIN/RCVD: CPT | Performed by: INTERNAL MEDICINE

## 2020-06-26 PROCEDURE — 99213 OFFICE O/P EST LOW 20 MIN: CPT | Performed by: INTERNAL MEDICINE

## 2020-06-26 PROCEDURE — G8427 DOCREV CUR MEDS BY ELIG CLIN: HCPCS | Performed by: INTERNAL MEDICINE

## 2020-06-26 RX ORDER — BLOOD-GLUCOSE METER
1 KIT MISCELLANEOUS DAILY
Qty: 1 KIT | Refills: 0 | Status: SHIPPED | OUTPATIENT
Start: 2020-06-26 | End: 2021-07-13 | Stop reason: ALTCHOICE

## 2020-06-26 ASSESSMENT — ENCOUNTER SYMPTOMS
NAUSEA: 0
ABDOMINAL DISTENTION: 0
ABDOMINAL PAIN: 0
VOMITING: 0
CONSTIPATION: 0
EYE REDNESS: 0
SHORTNESS OF BREATH: 0
WHEEZING: 0
DIARRHEA: 0

## 2020-06-26 NOTE — TELEPHONE ENCOUNTER
Please advise him to check blood sugar when he gets night sweats. He may discontinue metformin and see any improvement of symptoms  Does he have any fever, cough, unintentional weight loss( he told me he is actively trying to loss weight), any swollen glands, post shower itching?

## 2020-06-26 NOTE — TELEPHONE ENCOUNTER
Pt called, states he would like to talk to Dr. Dru Fontana. He said he forgot to mention his nigh sweats during his appt and would like to discuss it.

## 2020-06-26 NOTE — PROGRESS NOTES
Results   Component Value Date    TSHREFLEX 0.85 11/15/2019         ASSESSMENT/PLAN:    Magaly Breen was seen today for diabetes. Diagnoses and all orders for this visit:    Essential hypertension  Blood pressure has been well controlled. The current medical regimen is effective;  continue present plan and medications. Dyslipidemia  -     Lipid, Fasting; Future  -     CK; Future  -     HEPATIC FUNCTION PANEL; Future  Continue current Lipitor. Monitor side effect. He will get his blood work done in 3 months from now  Type 2 diabetes mellitus without complication, without long-term current use of insulin (HCC)  -     glucose monitoring kit (FREESTYLE) monitoring kit; 1 kit by Does not apply route daily  Continue metformin.         Orders Placed This Encounter   Procedures    Lipid, Fasting     Standing Status:   Future     Number of Occurrences:   1     Standing Expiration Date:   6/26/2021    CK     Standing Status:   Future     Number of Occurrences:   1     Standing Expiration Date:   6/26/2021    HEPATIC FUNCTION PANEL     Standing Status:   Future     Number of Occurrences:   1     Standing Expiration Date:   6/26/2021     Current Outpatient Medications   Medication Sig Dispense Refill    glucose monitoring kit (FREESTYLE) monitoring kit 1 kit by Does not apply route daily 1 kit 0    losartan-hydroCHLOROthiazide (HYZAAR) 50-12.5 MG per tablet TAKE 1/2 TABLET BY MOUTH ONE TIME A DAY 45 tablet 0    atorvastatin (LIPITOR) 20 MG tablet Take 1 tablet by mouth daily 90 tablet 1    metFORMIN (GLUCOPHAGE XR) 750 MG extended release tablet Take 1 tablet by mouth daily (with breakfast) 90 tablet 1    Zinc 50 MG TABS Take by mouth      Ascorbic Acid (VITAMIN C) 1000 MG tablet Take 2,000 mg by mouth 2 times daily      Coenzyme Q10 (COQ10) 100 MG CAPS Take by mouth daily      Calcium Carbonate (CALCIUM 600 PO) Take by mouth daily      magnesium oxide (MAG-OX) 400 MG tablet Take 400 mg by mouth daily     

## 2020-07-01 ENCOUNTER — NURSE TRIAGE (OUTPATIENT)
Dept: OTHER | Facility: CLINIC | Age: 80
End: 2020-07-01

## 2020-07-01 NOTE — TELEPHONE ENCOUNTER
Reason for Disposition   Numbness or tingling in one or both feet is a chronic symptom (recurrent or ongoing problem lasting > 4 weeks)    Answer Assessment - Initial Assessment Questions  1. SYMPTOM: \"What is the main symptom you are concerned about? \" (e.g., weakness, numbness)      Numbness and tingling in hands and feet  2. ONSET: \"When did this start? \" (minutes, hours, days; while sleeping)      unsure  3. LAST NORMAL: \"When was the last time you were normal (no symptoms)? \"      *No Answer*  4. PATTERN \"Does this come and go, or has it been constant since it started? \"  \"Is it present now? \"      *No Answer*  5. CARDIAC SYMPTOMS: \"Have you had any of the following symptoms: chest pain, difficulty breathing, palpitations? \"      *No Answer*  6. NEUROLOGIC SYMPTOMS: \"Have you had any of the following symptoms: headache, dizziness, vision loss, double vision, changes in speech, unsteady on your feet? \"      *No Answer*  7. OTHER SYMPTOMS: \"Do you have any other symptoms? \"      *No Answer*  8. PREGNANCY: \"Is there any chance you are pregnant? \" \"When was your last menstrual period? \"      *No Answer*    Protocols used: NEUROLOGIC DEFICIT-ADULT-[place]

## 2020-07-08 ENCOUNTER — OFFICE VISIT (OUTPATIENT)
Dept: INTERNAL MEDICINE CLINIC | Age: 80
End: 2020-07-08
Payer: MEDICARE

## 2020-07-08 VITALS
WEIGHT: 196.2 LBS | SYSTOLIC BLOOD PRESSURE: 128 MMHG | HEIGHT: 72 IN | BODY MASS INDEX: 26.57 KG/M2 | DIASTOLIC BLOOD PRESSURE: 76 MMHG | TEMPERATURE: 96.9 F | HEART RATE: 76 BPM

## 2020-07-08 DIAGNOSIS — R20.0 NUMBNESS AND TINGLING IN BOTH HANDS: ICD-10-CM

## 2020-07-08 DIAGNOSIS — R20.2 TINGLING OF BOTH FEET: ICD-10-CM

## 2020-07-08 DIAGNOSIS — R20.2 NUMBNESS AND TINGLING IN BOTH HANDS: ICD-10-CM

## 2020-07-08 PROCEDURE — 1123F ACP DISCUSS/DSCN MKR DOCD: CPT | Performed by: INTERNAL MEDICINE

## 2020-07-08 PROCEDURE — G8427 DOCREV CUR MEDS BY ELIG CLIN: HCPCS | Performed by: INTERNAL MEDICINE

## 2020-07-08 PROCEDURE — 4040F PNEUMOC VAC/ADMIN/RCVD: CPT | Performed by: INTERNAL MEDICINE

## 2020-07-08 PROCEDURE — 1036F TOBACCO NON-USER: CPT | Performed by: INTERNAL MEDICINE

## 2020-07-08 PROCEDURE — 99213 OFFICE O/P EST LOW 20 MIN: CPT | Performed by: INTERNAL MEDICINE

## 2020-07-08 PROCEDURE — G8417 CALC BMI ABV UP PARAM F/U: HCPCS | Performed by: INTERNAL MEDICINE

## 2020-07-08 RX ORDER — PANTOPRAZOLE SODIUM 40 MG/1
40 TABLET, DELAYED RELEASE ORAL
Qty: 30 TABLET | Refills: 0 | Status: SHIPPED | OUTPATIENT
Start: 2020-07-08 | End: 2021-03-12 | Stop reason: SDUPTHER

## 2020-07-08 ASSESSMENT — ENCOUNTER SYMPTOMS
PHOTOPHOBIA: 0
TROUBLE SWALLOWING: 0
SHORTNESS OF BREATH: 0
WHEEZING: 0

## 2020-07-08 NOTE — PROGRESS NOTES
[address] [facility]  1940  male  79 y.o. SUBJECTIVE:       Chief Complaint   Patient presents with    Numbness    Tingling       HPI:  Patient has been complaining of tingling numbness affecting gloves and stockings area for the last 2 to 3 weeks. His blood sugar has been well controlled, most of the time average blood sugar around 110. Patient denies any neck pain, any weakness in gripping,Any back pain or spine pain. He has been taking his blood pressure medicine and cholesterol medicine and metformin regularly. He denies drinking alcohol. Patient noticed some improvement of symptoms after starting oral vitamin B12  He gets occasional dyspeptic symptoms however never started on pantoprazole.     Past Medical History:   Diagnosis Date    DVT (deep venous thrombosis) (HCC)     Dysuria     Hypertension     Prostate enlargement      Past Surgical History:   Procedure Laterality Date    PROSTATE SURGERY       Social History     Socioeconomic History    Marital status:      Spouse name: Not on file    Number of children: 3    Years of education: Not on file    Highest education level: Not on file   Occupational History    Occupation: retired    Social Needs    Financial resource strain: Not on file    Food insecurity     Worry: Not on file     Inability: Not on file   [employer] needs     Medical: Not on file     Non-medical: Not on file   Tobacco Use    Smoking status: Former Smoker     Packs/day: 2.00     Years: 12.00     Pack years: 24.00     Types: Cigarettes     Last attempt to quit: 1972     Years since quittin.4    Smokeless tobacco: Never Used    Tobacco comment: started to smoke at 26 / smoked up to 2 ppd    Substance and Sexual Activity    Alcohol use: No    Drug use: No    Sexual activity: Yes   Lifestyle    Physical activity     Days per week: Not on file     Minutes per session: Not on file    Stress: Not on file   Relationships    Social connections Talks on phone: Not on file     Gets together: Not on file     Attends Anglican service: Not on file     Active member of club or organization: Not on file     Attends meetings of clubs or organizations: Not on file     Relationship status: Not on file    Intimate partner violence     Fear of current or ex partner: Not on file     Emotionally abused: Not on file     Physically abused: Not on file     Forced sexual activity: Not on file   Other Topics Concern    Not on file   Social History Narrative    Not on file     Family History   Problem Relation Age of Onset    Asthma Sister     Other Sister        Review of Systems   Constitutional: Negative for diaphoresis. HENT: Negative for trouble swallowing. Eyes: Negative for photophobia and visual disturbance. Respiratory: Negative for shortness of breath and wheezing. Cardiovascular: Negative for chest pain and palpitations. Neurological: Negative for dizziness and light-headedness. OBJECTIVE:  Pulse Readings from Last 4 Encounters:   07/08/20 76   06/26/20 72   06/04/20 78   02/12/20 65     Wt Readings from Last 4 Encounters:   07/08/20 196 lb 3.2 oz (89 kg)   06/26/20 197 lb (89.4 kg)   06/04/20 199 lb (90.3 kg)   11/27/19 217 lb (98.4 kg)     BP Readings from Last 4 Encounters:   07/08/20 128/76   06/26/20 136/82   06/04/20 (!) 142/104   02/12/20 137/85     Physical Exam  Vitals signs and nursing note reviewed. Constitutional:       Appearance: Normal appearance. Eyes:      General: No scleral icterus. Conjunctiva/sclera: Conjunctivae normal.   Cardiovascular:      Rate and Rhythm: Normal rate and regular rhythm. Pulses: Normal pulses. Heart sounds: Normal heart sounds. Pulmonary:      Effort: Pulmonary effort is normal.      Breath sounds: Normal breath sounds. Abdominal:      General: Bowel sounds are normal.      Palpations: Abdomen is soft. Musculoskeletal:      Right lower leg: No edema.       Left lower leg: numbness and tingling. Diagnoses and all orders for this visit:    Tingling of both feet  -     VITAMIN B12 & FOLATE; Future  -     SEDIMENTATION RATE; Future  -     NAZARIO; Future  -     PROTEIN ELECTROPHORESIS, SERUM; Future    Numbness and tingling in both hands  -     VITAMIN B12 & FOLATE; Future  -     SEDIMENTATION RATE; Future  -     NAZARIO; Future  -     PROTEIN ELECTROPHORESIS, SERUM; Future  Peripheral neuropathy symptoms of unclear etiology at this time. Need further work-up. May consider referral to neurologist and EMG nerve conduction study. Patient was advised to look the medication profile of gabapentin and let me know whether he will be interested to start this medicine. Gastroesophageal reflux disease, esophagitis presence not specified  -     pantoprazole (PROTONIX) 40 MG tablet;  Take 1 tablet by mouth every morning (before breakfast)        Further plan after initial lab work      Orders Placed This Encounter   Procedures    VITAMIN B12 & FOLATE     Standing Status:   Future     Standing Expiration Date:   7/8/2021    SEDIMENTATION RATE     Standing Status:   Future     Standing Expiration Date:   7/8/2021    NAZARIO     Standing Status:   Future     Standing Expiration Date:   7/8/2021    PROTEIN ELECTROPHORESIS, SERUM     Standing Status:   Future     Standing Expiration Date:   7/8/2021     Current Outpatient Medications   Medication Sig Dispense Refill    pantoprazole (PROTONIX) 40 MG tablet Take 1 tablet by mouth every morning (before breakfast) 30 tablet 0    glucose monitoring kit (FREESTYLE) monitoring kit 1 kit by Does not apply route daily 1 kit 0    losartan-hydroCHLOROthiazide (HYZAAR) 50-12.5 MG per tablet TAKE 1/2 TABLET BY MOUTH ONE TIME A DAY 45 tablet 0    atorvastatin (LIPITOR) 20 MG tablet Take 1 tablet by mouth daily 90 tablet 1    metFORMIN (GLUCOPHAGE XR) 750 MG extended release tablet Take 1 tablet by mouth daily (with breakfast) 90 tablet 1    Zinc 50 MG TABS Take by mouth      Ascorbic Acid (VITAMIN C) 1000 MG tablet Take 2,000 mg by mouth 2 times daily      Misc Natural Products (GLUCOSAMINE-CHONDROITIN DS) TABS Take by mouth 2 times daily      Coenzyme Q10 (COQ10) 100 MG CAPS Take by mouth daily      Calcium Carbonate (CALCIUM 600 PO) Take by mouth daily      magnesium oxide (MAG-OX) 400 MG tablet Take 400 mg by mouth daily      Selenium 200 MCG TABS Take by mouth nightly      Multiple Vitamins-Minerals (THERAPEUTIC MULTIVITAMIN-MINERALS) tablet Take 1 tablet by mouth daily      vitamin E 400 UNIT capsule Take 400 Units by mouth daily      B Complex Vitamins (B COMPLEX 1 PO) Take by mouth daily Plus B12 vitamin       No current facility-administered medications for this visit. Return in about 4 weeks (around 8/5/2020). An After Visit Summary was printed and given to the patient. Documentation was done using voice recognition dragon software. Every effort was made to ensure accuracy; however, inadvertent  Unintentional computerized transcription errors may be present.

## 2020-07-09 LAB
ANTI-NUCLEAR ANTIBODY (ANA): NEGATIVE
FOLATE: >20 NG/ML (ref 4.78–24.2)
SEDIMENTATION RATE, ERYTHROCYTE: 5 MM/HR (ref 0–20)
VITAMIN B-12: >2000 PG/ML (ref 211–911)

## 2020-07-10 LAB
ALBUMIN SERPL-MCNC: 3.7 G/DL (ref 3.1–4.9)
ALPHA-1-GLOBULIN: 0.2 G/DL (ref 0.2–0.4)
ALPHA-2-GLOBULIN: 0.4 G/DL (ref 0.4–1.1)
BETA GLOBULIN: 1.1 G/DL (ref 0.9–1.6)
GAMMA GLOBULIN: 1.4 G/DL (ref 0.6–1.8)
SPE/IFE INTERPRETATION: NORMAL
TOTAL PROTEIN: 6.9 G/DL (ref 6.4–8.2)

## 2020-07-14 ENCOUNTER — OFFICE VISIT (OUTPATIENT)
Dept: NEUROLOGY | Age: 80
End: 2020-07-14
Payer: MEDICARE

## 2020-07-14 VITALS
BODY MASS INDEX: 26.68 KG/M2 | DIASTOLIC BLOOD PRESSURE: 75 MMHG | HEIGHT: 72 IN | WEIGHT: 197 LBS | SYSTOLIC BLOOD PRESSURE: 134 MMHG | HEART RATE: 73 BPM

## 2020-07-14 PROCEDURE — 4040F PNEUMOC VAC/ADMIN/RCVD: CPT | Performed by: PSYCHIATRY & NEUROLOGY

## 2020-07-14 PROCEDURE — G8427 DOCREV CUR MEDS BY ELIG CLIN: HCPCS | Performed by: PSYCHIATRY & NEUROLOGY

## 2020-07-14 PROCEDURE — G8417 CALC BMI ABV UP PARAM F/U: HCPCS | Performed by: PSYCHIATRY & NEUROLOGY

## 2020-07-14 PROCEDURE — 99204 OFFICE O/P NEW MOD 45 MIN: CPT | Performed by: PSYCHIATRY & NEUROLOGY

## 2020-07-14 PROCEDURE — 1036F TOBACCO NON-USER: CPT | Performed by: PSYCHIATRY & NEUROLOGY

## 2020-07-14 PROCEDURE — 1123F ACP DISCUSS/DSCN MKR DOCD: CPT | Performed by: PSYCHIATRY & NEUROLOGY

## 2020-07-14 NOTE — PROGRESS NOTES
NEUROLOGY CONSULTATION     Chief Complaint   Patient presents with   [address] [facility] Patient     dr Lucianne Mohs , numbness and tingling of both hands and feet, started after shut down of the gym (around beginning of covid)       HISTORY OF PRESENT ILLNESS :    Radha Rojas is a 79 y.o. male who is referred by Dr. Isadora Ahn   History was obtained from patient  Additional history was obtained from his wife. Patient complains of tingling and numbness in his hands and feet. Hands are more involved than the feet. Symptoms started approximately around early March 2020. Patient states that the symptoms may be slightly improving. She has noticed some numbness at times in the right leg as well. Patient has diabetes which was diagnosed earlier this year and is on medication. Recent hemoglobin A1c is 5.8 and the diabetes is well controlled. Comorbidities include hypertension and a benign prostatic enlargement  Symptom onset was gradual.  Symptoms are persistent. The symptoms are mild to moderate in severity. No clear aggravating or relieving factors to symptoms.     REVIEW OF SYSTEMS    Constitutional:  []   Chills   []  Fatigue   []  Fevers   []  Malaise   []  Weight loss     [x] Denies all of the above    Eyes:  []  Double vision   []  Blurry vision     [x] Denies all of the above    Ears, nose, mouth, throat, and face:   [] Hearing loss    []   Hoarseness      [x]  Snoring    []  Tinnitus       [] Denies all of the above     Respiratory:   []  Cough    []  Shortness of breath         [x] Denies all of the above     Cardiovascular:   []  Chest pain    []  Exertional chest pressure/discomfort           [] Palpitations    []  Syncope     [x] Denies all of the above    Gastrointestinal:   []  Abdominal pain   []  Constipation    []  Diarrhea    []   Dysphagia                      [x] Denies all of the above    Genitourinary:      [x]  Frequency   []  Hematuria []  Urinary incontinence           [] Denies all of the above     Hematologic/lymphatic:  []  Bleeding    []  Easy bruising   []  Anemia  [x] Denies all of the above     Musculoskeletal:   [] Back pain       []  Myalgias    []  Neck pain           [x] Denies all of the above    Neurological: As noted in HPI    Behavioral/Psych:   [] Anxiety    []  Depression     []  Mood swings     [x] Denies all of the above     Endocrine:   []  Temperature intolerance     [] Fatigue      [x] Denies all of the above     Allergic/Immunologic:   [] Hay fever    [x] Denies all of the above     Past Medical History:   Diagnosis Date    DVT [facility]deep venous thrombosis) ([facility])     Dysuria     Hypertension     Prostate enlargement      Family History   Problem Relation Age of Onset    Asthma Sister     Other Sister      Social History     Socioeconomic History    Marital status:      Spouse name: None    Number of children: 3    Years of education: None    Highest education level: None   Occupational History    Occupation: retired    Social Needs    Financial resource strain: None    Food insecurity     Worry: None     Inability: None    Transportation needs     Medical: None     Non-medical: None   Tobacco Use    Smoking status: Former Smoker     Packs/day: 2.00     Years: 12.00     Pack years: 24.00     Types: Cigarettes     Last attempt to quit: 1972     Years since quittin.4    Smokeless tobacco: Never Used    Tobacco comment: started to smoke at 26 / smoked up to 2 ppd    Substance and Sexual Activity    Alcohol use: No    Drug use: No    Sexual activity: Yes   Lifestyle    Physical activity     Days per week: None     Minutes per session: None    Stress: None   Relationships    Social connections     Talks on phone: None     Gets together: None     Attends Hindu service: None     Active member of club or organization: None     Attends meetings of clubs or organizations: None 11/15/2019     11/15/2019    MPV 8.9 11/15/2019     BMP:    Lab Results   Component Value Date     06/04/2020    K 4.0 06/04/2020    CL 97 06/04/2020    CO2 29 06/04/2020    BUN 11 06/04/2020    LABALBU 3.7 07/08/2020    CREATININE 0.9 06/04/2020    CALCIUM 9.2 06/04/2020    GFRAA >60 06/04/2020    LABGLOM >60 06/04/2020    GLUCOSE 111 06/04/2020       IMPRESSION :  Idiopathic peripheral neuropathy. Patient has mild diabetes but his blood sugar is fairly controlled and hemoglobin A1c is 5.8. Other metabolic etiologies should be considered and ruled out. TSH is normal.    Coexisting mononeuropathy in the arms will also be considered and ruled out. RECOMMENDATIONS :  Discussed with patient and his wife  I will check B12 and serum protein immunofixation levels. I will see him back and do EMG nerve conduction studies of both upper extremities and one lower extremity. Thank you for this consultation. Please note a portion of this chart was generated using dragon dictation software. Although every effort was made to ensure the accuracy of this automated transcription, some errors in transcription may have occurred.

## 2020-07-15 DIAGNOSIS — G60.9 IDIOPATHIC PERIPHERAL NEUROPATHY: ICD-10-CM

## 2020-07-15 LAB — VITAMIN B-12: >2000 PG/ML (ref 211–911)

## 2020-07-17 LAB
ALBUMIN SERPL-MCNC: 3.5 G/DL (ref 3.1–4.9)
ALPHA-1-GLOBULIN: 0.3 G/DL (ref 0.2–0.4)
ALPHA-2-GLOBULIN: 0.6 G/DL (ref 0.4–1.1)
BETA GLOBULIN: 1 G/DL (ref 0.9–1.6)
GAMMA GLOBULIN: 1.4 G/DL (ref 0.6–1.8)
SPE/IFE INTERPRETATION: NORMAL
TOTAL PROTEIN: 6.7 G/DL (ref 6.4–8.2)

## 2020-08-03 ENCOUNTER — OFFICE VISIT (OUTPATIENT)
Dept: PULMONOLOGY | Age: 80
End: 2020-08-03
Payer: MEDICARE

## 2020-08-03 VITALS — HEART RATE: 76 BPM | DIASTOLIC BLOOD PRESSURE: 71 MMHG | SYSTOLIC BLOOD PRESSURE: 130 MMHG | OXYGEN SATURATION: 97 %

## 2020-08-03 PROCEDURE — 1036F TOBACCO NON-USER: CPT | Performed by: INTERNAL MEDICINE

## 2020-08-03 PROCEDURE — 4040F PNEUMOC VAC/ADMIN/RCVD: CPT | Performed by: INTERNAL MEDICINE

## 2020-08-03 PROCEDURE — 99213 OFFICE O/P EST LOW 20 MIN: CPT | Performed by: INTERNAL MEDICINE

## 2020-08-03 PROCEDURE — G8417 CALC BMI ABV UP PARAM F/U: HCPCS | Performed by: INTERNAL MEDICINE

## 2020-08-03 PROCEDURE — G8427 DOCREV CUR MEDS BY ELIG CLIN: HCPCS | Performed by: INTERNAL MEDICINE

## 2020-08-03 PROCEDURE — 1123F ACP DISCUSS/DSCN MKR DOCD: CPT | Performed by: INTERNAL MEDICINE

## 2020-08-03 NOTE — PROGRESS NOTES
[facility]  Follow Up Note  Edmond Elise MD       Zainab Anngh   YOB: 1940    Date of Visit:  8/3/2020    Assessment/Plan:  1. Bilateral pulmonary embolism (HCC)  Reviewed past studies with the patient and his wife  Tolerating his present status without anticoagulation  No reason to start anticoagulation at this point  No additional studies are needed at this time  Follow-up here as needed t    2. Essential hypertension  Stable    F/U as needed    Chief Complaint   Patient presents with    Shortness of Breath     6 month f.u.       HPI  The patient presents with a chief complaint of bilateral PE. The patient has been off Xarelto for quite a while now. He has not had any evidence of recurrent DVT or PE. He denies chest pain or shortness of breath. There is no complaint of chest pain, nausea or vomiting. His initial shortness of breath is resolved. Review of Systems  As reviewed in HPI    History  I have reviewed past medical, surgical, social and family history. This is documented elsewhere in the medical record. Physical Exam:  Well developed, well nourished  Alert and oriented  Sclera is clear  No cervical adenopathy  No JVD. Chest examination is clear. Cardiac examination reveals regular rate and rhythm without murmur, gallop or rub. The abdomen is soft, nontender and nondistended. There is no clubbing, cyanosis or edema of the extremities. There is no obvious skin rash. No focal neuro deficicts  Normal mood and affect    Allergies   Allergen Reactions    No Known Allergies      Prior to Visit Medications    Medication Sig Taking?  Authorizing Provider   pantoprazole (PROTONIX) 40 MG tablet Take 1 tablet by mouth every morning (before breakfast)  Irish Worthy MD   glucose monitoring kit (FREESTYLE) monitoring kit 1 kit by Does not apply route daily  Irish Worthy MD   losartan-hydroCHLOROthiazide (HYZAAR) 50-12.5 MG per tablet TAKE 1/2 TABLET BY MOUTH ONE TIME A DAY  DEBORAH Rios MD   atorvastatin (LIPITOR) 20 MG tablet Take 1 tablet by mouth daily  Chandan Wyatt MD   metFORMIN (GLUCOPHAGE XR) 750 MG extended release tablet Take 1 tablet by mouth daily (with breakfast)  Chandan Wyatt MD   Zinc 50 MG TABS Take by mouth  Historical Provider, MD   Ascorbic Acid (VITAMIN C) 1000 MG tablet Take 2,000 mg by mouth 2 times daily  Historical Provider, MD   Misc Natural Products (GLUCOSAMINE-CHONDROITIN DS) TABS Take by mouth 2 times daily  Historical Provider, MD   Coenzyme Q10 (COQ10) 100 MG CAPS Take by mouth daily  Historical Provider, MD   Calcium Carbonate (CALCIUM 600 PO) Take by mouth daily  Historical Provider, MD   magnesium oxide (MAG-OX) 400 MG tablet Take 400 mg by mouth daily  Historical Provider, MD   Selenium 200 MCG TABS Take by mouth nightly  Historical Provider, MD   Multiple Vitamins-Minerals (THERAPEUTIC MULTIVITAMIN-MINERALS) tablet Take 1 tablet by mouth daily  Historical Provider, MD   vitamin E 400 UNIT capsule Take 400 Units by mouth daily  Historical Provider, MD   B Complex Vitamins (B COMPLEX 1 PO) Take by mouth daily Plus B12 vitamin  Historical Provider, MD       Vitals:    20 1421   BP: 130/71   Pulse: 76   SpO2: 97%     There is no height or weight on file to calculate BMI.      Wt Readings from Last 3 Encounters:   20 197 lb (89.4 kg)   20 196 lb 3.2 oz (89 kg)   20 197 lb (89.4 kg)     BP Readings from Last 3 Encounters:   20 130/71   20 134/75   20 128/76        Social History     Tobacco Use   Smoking Status Former Smoker    Packs/day: 2.00    Years: 12.00    Pack years: 24.00    Types: Cigarettes    Last attempt to quit: 1972    Years since quittin.5   Smokeless Tobacco Never Used   Tobacco Comment    started to smoke at 26 / smoked up to 2 ppd

## 2020-08-05 ENCOUNTER — OFFICE VISIT (OUTPATIENT)
Dept: INTERNAL MEDICINE CLINIC | Age: 80
End: 2020-08-05
Payer: MEDICARE

## 2020-08-05 VITALS
DIASTOLIC BLOOD PRESSURE: 80 MMHG | SYSTOLIC BLOOD PRESSURE: 132 MMHG | BODY MASS INDEX: 26.01 KG/M2 | HEIGHT: 72 IN | TEMPERATURE: 98.6 F | HEART RATE: 84 BPM | WEIGHT: 192 LBS

## 2020-08-05 PROCEDURE — G8417 CALC BMI ABV UP PARAM F/U: HCPCS | Performed by: INTERNAL MEDICINE

## 2020-08-05 PROCEDURE — G8427 DOCREV CUR MEDS BY ELIG CLIN: HCPCS | Performed by: INTERNAL MEDICINE

## 2020-08-05 PROCEDURE — 1123F ACP DISCUSS/DSCN MKR DOCD: CPT | Performed by: INTERNAL MEDICINE

## 2020-08-05 PROCEDURE — 99213 OFFICE O/P EST LOW 20 MIN: CPT | Performed by: INTERNAL MEDICINE

## 2020-08-05 PROCEDURE — 4040F PNEUMOC VAC/ADMIN/RCVD: CPT | Performed by: INTERNAL MEDICINE

## 2020-08-05 PROCEDURE — 1036F TOBACCO NON-USER: CPT | Performed by: INTERNAL MEDICINE

## 2020-08-05 RX ORDER — CHOLECALCIFEROL (VITAMIN D3) 25 MCG
TABLET,CHEWABLE ORAL DAILY
COMMUNITY
End: 2021-01-07 | Stop reason: ALTCHOICE

## 2020-08-05 ASSESSMENT — ENCOUNTER SYMPTOMS
NAUSEA: 0
TROUBLE SWALLOWING: 0
SHORTNESS OF BREATH: 0
VOICE CHANGE: 0
PHOTOPHOBIA: 0
ABDOMINAL PAIN: 0
VOMITING: 0
WHEEZING: 0

## 2020-08-05 NOTE — PROGRESS NOTES
[address] [facility]  1940  male  79 y.o. SUBJECTIVE:       Chief Complaint   Patient presents with    1 Month Follow-Up     tingling feet, ambulating without device or issue       HPI:  Follow-up visit. Patient continue to suffer from tingling numbness affecting both hands as well as both feet. Initial work-up has been negative. He was evaluated by neurologist.  He is going to have EMG nerve conduction study. Patient is requesting to get tested for type and screen. Patient  still get occasional epigastric pain. He is taking pantoprazole. He lost about 5 pounds since last visit. He feels his appetite is excellent.   He is rather eating better and trying to lose weight    Past Medical History:   Diagnosis Date    DVT (deep venous thrombosis) (HCC)     Dysuria     Hypertension     Prostate enlargement      Past Surgical History:   Procedure Laterality Date    PROSTATE SURGERY       Social History     Socioeconomic History    Marital status:      Spouse name: None    Number of children: 3    Years of education: None    Highest education level: None   Occupational History    Occupation: retired    Social Needs    Financial resource strain: None    Food insecurity     Worry: None     Inability: None    Transportation needs     Medical: None     Non-medical: None   Tobacco Use    Smoking status: Former Smoker     Packs/day: 2.00     Years: 12.00     Pack years: 24.00     Types: Cigarettes     Last attempt to quit: 1972     Years since quittin.5    Smokeless tobacco: Never Used    Tobacco comment: started to smoke at 26 / smoked up to 2 ppd    Substance and Sexual Activity    Alcohol use: No    Drug use: No    Sexual activity: Yes   Lifestyle    Physical activity     Days per week: None     Minutes per session: None    Stress: None   Relationships    Social connections     Talks on phone: None     Gets together: None     Attends Yarsani service: None     Active CMP:  Lab Results   Component Value Date     06/04/2020    K 4.0 06/04/2020    CL 97 06/04/2020    CO2 29 06/04/2020    ANIONGAP 11 06/04/2020    GLUCOSE 111 06/04/2020    BUN 11 06/04/2020    CREATININE 0.9 06/04/2020    GFRAA >60 06/04/2020    CALCIUM 9.2 06/04/2020    PROT 6.7 07/15/2020    LABALBU 3.5 07/15/2020    AGRATIO 1.3 11/15/2019    BILITOT 0.8 11/15/2019    ALKPHOS 36 11/15/2019    ALT 11 11/15/2019    AST 20 11/15/2019    GLOB 3.2 11/15/2019     URINALYSIS:  Lab Results   Component Value Date    GLUCOSEU Negative 06/04/2020    KETUA TRACE 06/04/2020    SPECGRAV 1.013 06/04/2020    BLOODU Negative 06/04/2020    PHUR 7.5 06/04/2020    PROTEINU Negative 06/04/2020    NITRU Negative 06/04/2020    LEUKOCYTESUR Negative 06/04/2020    LABMICR Not Indicated 06/04/2020    URINETYPE [place] 06/04/2020     HBA1C:   Lab Results   Component Value Date    LABA1C 5.8 06/04/2020    .8 06/04/2020     MICRO/ALB:   Lab Results   Component Value Date    LABMICR Not Indicated 06/04/2020     LIPID:  Lab Results   Component Value Date    CHOL 192 11/15/2019    TRIG 61 11/15/2019    HDL 62 06/04/2020    LDLCALC 121 06/04/2020    LABVLDL 9 06/04/2020     TSH:   Lab Results   Component Value Date    TSHREFLEX 0.85 11/15/2019         ASSESSMENT/PLAN:  Idiopathic neuropathy. Symptoms does not affect her daily living and activities. She is going to have further work-up with neurologist.  1. Numbness and tingling in both hands    2. Tingling of both feet    3. Type 2 diabetes mellitus without complication, without long-term current use of insulin (HCC) -may contribute to neuropathy however diabetes has been excellent control   4.  Gastroesophageal reflux disease, esophagitis presence not specified            Orders Placed This Encounter   Procedures   Severiano Oram MD, Gastroenterology, [facility]     Referral Priority:   Routine     Referral Type:   Eval and Treat     Referral Reason: Specialty Services Required     Referred to Provider:   Katelynn Young MD     Requested Specialty:   Gastroenterology     Number of Visits Requested:   1    TYPE AND SCREEN     Standing Status:   Future     Standing Expiration Date:   8/5/2021     Current Outpatient Medications   Medication Sig Dispense Refill    Cyanocobalamin (B-12) 2500 MCG TABS Take by mouth daily      pantoprazole (PROTONIX) 40 MG tablet Take 1 tablet by mouth every morning (before breakfast) 30 tablet 0    losartan-hydroCHLOROthiazide (HYZAAR) 50-12.5 MG per tablet TAKE 1/2 TABLET BY MOUTH ONE TIME A DAY 45 tablet 0    atorvastatin (LIPITOR) 20 MG tablet Take 1 tablet by mouth daily 90 tablet 1    metFORMIN (GLUCOPHAGE XR) 750 MG extended release tablet Take 1 tablet by mouth daily (with breakfast) 90 tablet 1    Zinc 50 MG TABS Take by mouth      Ascorbic Acid (VITAMIN C) 1000 MG tablet Take 2,000 mg by mouth 2 times daily      Misc Natural Products (GLUCOSAMINE-CHONDROITIN DS) TABS Take by mouth 2 times daily      Coenzyme Q10 (COQ10) 100 MG CAPS Take by mouth daily      Calcium Carbonate (CALCIUM 600 PO) Take by mouth daily      magnesium oxide (MAG-OX) 400 MG tablet Take 400 mg by mouth daily      Selenium 200 MCG TABS Take by mouth nightly      Multiple Vitamins-Minerals (THERAPEUTIC MULTIVITAMIN-MINERALS) tablet Take 1 tablet by mouth daily      vitamin E 400 UNIT capsule Take 400 Units by mouth daily      B Complex Vitamins (B COMPLEX 1 PO) Take by mouth daily Plus B12 vitamin      glucose monitoring kit (FREESTYLE) monitoring kit 1 kit by Does not apply route daily 1 kit 0     No current facility-administered medications for this visit. Return in about 3 months (around 11/5/2020). An After Visit Summary was printed and given to the patient. Documentation was done using voice recognition dragon software.   Every effort was made to ensure accuracy; however, inadvertent  Unintentional computerized transcription errors may be present.

## 2020-08-12 ENCOUNTER — TELEPHONE (OUTPATIENT)
Dept: INTERNAL MEDICINE CLINIC | Age: 80
End: 2020-08-12

## 2020-08-12 DIAGNOSIS — K21.9 GASTROESOPHAGEAL REFLUX DISEASE, ESOPHAGITIS PRESENCE NOT SPECIFIED: Primary | ICD-10-CM

## 2020-08-12 NOTE — TELEPHONE ENCOUNTER
Pt called in said he needs the referral for a gastroenterologist sent to Dr. Lin Muñiz in [place], ph# [phone]. The other office you originally referred him to had too long of a wait. Thank you.

## 2020-08-20 ENCOUNTER — TELEPHONE (OUTPATIENT)
Dept: RHEUMATOLOGY | Age: 80
End: 2020-08-20

## 2020-08-20 RX ORDER — GLUCOSAMINE HCL/CHONDROITIN SU 500-400 MG
CAPSULE ORAL
Qty: 100 STRIP | Refills: 0 | Status: SHIPPED | OUTPATIENT
Start: 2020-08-20 | End: 2021-07-13 | Stop reason: ALTCHOICE

## 2020-08-20 NOTE — TELEPHONE ENCOUNTER
Pt calling to request a refill of his testing strips Pt says he needs asap    [facility] on [address] [facility]

## 2020-09-10 ENCOUNTER — OFFICE VISIT (OUTPATIENT)
Dept: NEUROLOGY | Age: 80
End: 2020-09-10
Payer: MEDICARE

## 2020-09-10 ENCOUNTER — PROCEDURE VISIT (OUTPATIENT)
Dept: NEUROLOGY | Age: 80
End: 2020-09-10
Payer: MEDICARE

## 2020-09-10 VITALS
BODY MASS INDEX: 24.19 KG/M2 | HEART RATE: 81 BPM | WEIGHT: 178.6 LBS | HEIGHT: 72 IN | DIASTOLIC BLOOD PRESSURE: 88 MMHG | SYSTOLIC BLOOD PRESSURE: 143 MMHG

## 2020-09-10 PROCEDURE — G8427 DOCREV CUR MEDS BY ELIG CLIN: HCPCS | Performed by: PSYCHIATRY & NEUROLOGY

## 2020-09-10 PROCEDURE — 99213 OFFICE O/P EST LOW 20 MIN: CPT | Performed by: PSYCHIATRY & NEUROLOGY

## 2020-09-10 PROCEDURE — 4040F PNEUMOC VAC/ADMIN/RCVD: CPT | Performed by: PSYCHIATRY & NEUROLOGY

## 2020-09-10 PROCEDURE — 95886 MUSC TEST DONE W/N TEST COMP: CPT | Performed by: PSYCHIATRY & NEUROLOGY

## 2020-09-10 PROCEDURE — 95912 NRV CNDJ TEST 11-12 STUDIES: CPT | Performed by: PSYCHIATRY & NEUROLOGY

## 2020-09-10 PROCEDURE — 1036F TOBACCO NON-USER: CPT | Performed by: PSYCHIATRY & NEUROLOGY

## 2020-09-10 PROCEDURE — G8420 CALC BMI NORM PARAMETERS: HCPCS | Performed by: PSYCHIATRY & NEUROLOGY

## 2020-09-10 PROCEDURE — 1123F ACP DISCUSS/DSCN MKR DOCD: CPT | Performed by: PSYCHIATRY & NEUROLOGY

## 2020-09-10 NOTE — PROGRESS NOTES
[facility]   Neurology followup     Subjective:   CC/HP  History was obtained from the patient. Patient continues to have tingling and numbness in his arms and legs. He is here for EMG and nerve conduction studies as well as follow-up. Detailed history:  Patient complains of tingling and numbness in his hands and feet. Hands are more involved than the feet. Symptoms started approximately around early March 2020. Patient states that the symptoms may be slightly improving. She has noticed some numbness at times in the right leg as well. Patient has diabetes which was diagnosed earlier this year and is on medication. Recent hemoglobin A1c is 5.8 and the diabetes is well controlled. Comorbidities include hypertension and a benign prostatic enlargement  Symptom onset was gradual.  Symptoms are persistent. The symptoms are mild to moderate in severity. No clear aggravating or relieving factors to symptoms     REVIEW OF SYSTEMS     Constitutional:  []? Chills   []? Fatigue   []? Fevers   []? Malaise   []? Weight loss     [x]? Denies all of the above     Respiratory:   []? Cough    []? Shortness of breath         [x]? Denies all of the above      Cardiovascular:   []? Chest pain    []? Exertional chest pressure/discomfort           []? Palpitations    []? Syncope     [x]?  Denies all of the above           Past Medical History        Past Medical History:   Diagnosis Date    DVT (deep venous thrombosis) ([facility])      Dysuria      Hypertension      Prostate enlargement          Family History         Family History   Problem Relation Age of Onset    Asthma Sister      Other Sister          Social History   Social History            Socioeconomic History    Marital status:        Spouse name: None    Number of children: 3    Years of education: None    Highest education level: None   Occupational History    Occupation: retired    Social Needs    Financial resource strain: None  Food insecurity       Worry: None       Inability: None    Transportation needs       Medical: None       Non-medical: None   Tobacco Use    Smoking status: Former Smoker       Packs/day: 2.00       Years: 12.00       Pack years: 24.00       Types: Cigarettes       Last attempt to quit: 1972       Years since quittin.6    Smokeless tobacco: Never Used    Tobacco comment: started to smoke at 26 / smoked up to 2 ppd    Substance and Sexual Activity    Alcohol use: No    Drug use: No    Sexual activity: Yes   Lifestyle    Physical activity       Days per week: None       Minutes per session: None    Stress: None   Relationships    Social connections       Talks on phone: None       Gets together: None       Attends [employer] service: None       Active member of club or organization: None       Attends meetings of clubs or organizations: None       Relationship status: None    Intimate partner violence       Fear of current or ex partner: None       Emotionally abused: None       Physically abused: None       Forced sexual activity: None   Other Topics Concern    None   Social History Narrative    None           Objective:  Exam:  BP (!) 143/88   Pulse 81   Ht 6' (1.829 m)   Wt 178 lb 9.6 oz (81 kg)   BMI 24.22 kg/m²   This is a well-nourished patient in no acute distress  Patient is awake, alert and oriented x3. Speech is normal.  Pupils are equal round reacting to light. Extraocular movements intact. Face symmetrical. Tongue midline. Motor exam shows normal symmetrical strength. Deep tendon reflexes decreased in the legs. Plantar reflexes downgoing. 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 Coordination normal. Gait normal. No carotid bruit.  No neck stiffness.       Data :  LABS:  General Labs:    CBC:   Lab Results   Component Value Date    WBC 4.2 11/15/2019    RBC 5.10 11/15/2019    HGB 15.0 11/15/2019    HCT 45.3 11/15/2019    MCV 88.9 11/15/2019    MCH 29.5 11/15/2019    MCHC 33.2 11/15/2019    RDW 13.2 11/15/2019     11/15/2019    MPV 8.9 11/15/2019     BMP:    Lab Results   Component Value Date     06/04/2020    K 4.0 06/04/2020    CL 97 06/04/2020    CO2 29 06/04/2020    BUN 11 06/04/2020    LABALBU 3.5 07/15/2020    CREATININE 0.9 06/04/2020    CALCIUM 9.2 06/04/2020    GFRAA >60 06/04/2020    LABGLOM >60 06/04/2020    GLUCOSE 111 06/04/2020        Impression :  EMG and nerve conduction studies showed 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 There was also evidence for right S1 radiculopathy. There was some minor vague neuropathic changes in the legs probably due to hyperglycemia     Plan :  Discussed with patient and his family  Decided that we will adopt a conservative management at this time  Continue with good control of diabetes  Return in 3 months. If his symptoms persist I may consider MRI lumbar spine as well as referral to a hand surgeon for the ulnar nerve entrapment.           Please note a portion of  this chart was generated using dragon dictation software. Although every effort was made to ensure the accuracy of this automated transcription, some errors in transcription may have occurred.    .ne

## 2020-09-10 NOTE — PROGRESS NOTES
Radha Shepard M.D. [facility]) [facility]  Board Certified in [address] [facility] [address] [place] [address], [address]    EMG / NERVE CONDUCTION STUDY    PATIENT:     Del Young      DATE OF EM/10/2020    YOB: 1940       REASON FOR EMG:   Tingling and numbness in the arms and legs    REFERRING PHYSICIAN:  Radha Shepard MD    SUMMARY:   Bilateral median motor nerve studies were normal  Bilateral median sensory nerve studies with prolonged distal latencies  Bilateral ulnar motor nerve studies had moderately severe slowing of conduction velocities across the elbow  Bilateral ulnar sensory studies with slightly prolonged distal latencies  The right peroneal motor nerve study had a low amplitude and slightly slow conduction velocity. The right posterior tibial motor nerve study had a slightly low amplitude. The right sural sensory was normal.  Needle EMG of several muscles in both upper extremities showed decreased motor units in the first dorsal interosseous muscles bilaterally. Needle EMG of the right lower extremity showed evidence of denervation in the S1 innervated muscles and of the lumbar paraspinal muscles. CLINICAL DIAGNOSIS:    Peripheral neuropathy       EMG RESULTS:   1.  Moderately severe bilateral ulnar nerve lesions at the elbow  2. Mild bilateral median nerve lesions at the wrist.  (Carpal tunnel syndrome). 3.  Mild right S1 nerve root lesion  4. 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   Electromyographer/Neurologist

## 2020-09-24 ENCOUNTER — OFFICE VISIT (OUTPATIENT)
Dept: INTERNAL MEDICINE CLINIC | Age: 80
End: 2020-09-24
Payer: MEDICARE

## 2020-09-24 VITALS
BODY MASS INDEX: 23.57 KG/M2 | WEIGHT: 174 LBS | HEART RATE: 90 BPM | SYSTOLIC BLOOD PRESSURE: 112 MMHG | DIASTOLIC BLOOD PRESSURE: 82 MMHG | TEMPERATURE: 98.2 F | HEIGHT: 72 IN

## 2020-09-24 DIAGNOSIS — Z12.5 SCREENING PSA (PROSTATE SPECIFIC ANTIGEN): ICD-10-CM

## 2020-09-24 DIAGNOSIS — R10.84 GENERALIZED ABDOMINAL PAIN: ICD-10-CM

## 2020-09-24 DIAGNOSIS — R05.9 COUGH: ICD-10-CM

## 2020-09-24 DIAGNOSIS — R63.4 WEIGHT LOSS: ICD-10-CM

## 2020-09-24 LAB
A/G RATIO: 1.6 (ref 1.1–2.2)
ALBUMIN SERPL-MCNC: 4 G/DL (ref 3.4–5)
ALP BLD-CCNC: 27 U/L (ref 40–129)
ALT SERPL-CCNC: 14 U/L (ref 10–40)
ANION GAP SERPL CALCULATED.3IONS-SCNC: 11 MMOL/L (ref 3–16)
AST SERPL-CCNC: 21 U/L (ref 15–37)
BILIRUB SERPL-MCNC: 1.1 MG/DL (ref 0–1)
BILIRUBIN URINE: NEGATIVE
BLOOD, URINE: NEGATIVE
BUN BLDV-MCNC: 14 MG/DL (ref 7–20)
CALCIUM SERPL-MCNC: 9.5 MG/DL (ref 8.3–10.6)
CHLORIDE BLD-SCNC: 96 MMOL/L (ref 99–110)
CLARITY: CLEAR
CO2: 29 MMOL/L (ref 21–32)
COLOR: YELLOW
CREAT SERPL-MCNC: 1 MG/DL (ref 0.8–1.3)
GFR AFRICAN AMERICAN: >60
GFR NON-AFRICAN AMERICAN: >60
GLOBULIN: 2.5 G/DL
GLUCOSE BLD-MCNC: 116 MG/DL (ref 70–99)
GLUCOSE URINE: NEGATIVE MG/DL
HCT VFR BLD CALC: 42.6 % (ref 40.5–52.5)
HEMOGLOBIN: 14.4 G/DL (ref 13.5–17.5)
KETONES, URINE: NEGATIVE MG/DL
LEUKOCYTE ESTERASE, URINE: NEGATIVE
MCH RBC QN AUTO: 29.9 PG (ref 26–34)
MCHC RBC AUTO-ENTMCNC: 33.9 G/DL (ref 31–36)
MCV RBC AUTO: 88.2 FL (ref 80–100)
MICROSCOPIC EXAMINATION: NORMAL
NITRITE, URINE: NEGATIVE
PDW BLD-RTO: 13.8 % (ref 12.4–15.4)
PH UA: 7 (ref 5–8)
PLATELET # BLD: 191 K/UL (ref 135–450)
PMV BLD AUTO: 8.1 FL (ref 5–10.5)
POTASSIUM SERPL-SCNC: 4.1 MMOL/L (ref 3.5–5.1)
PROSTATE SPECIFIC ANTIGEN: 2.79 NG/ML (ref 0–4)
PROTEIN UA: NEGATIVE MG/DL
RBC # BLD: 4.83 M/UL (ref 4.2–5.9)
SODIUM BLD-SCNC: 136 MMOL/L (ref 136–145)
SPECIFIC GRAVITY UA: 1.01 (ref 1–1.03)
TOTAL PROTEIN: 6.5 G/DL (ref 6.4–8.2)
URINE TYPE: NORMAL
UROBILINOGEN, URINE: 0.2 E.U./DL
WBC # BLD: 3.1 K/UL (ref 4–11)

## 2020-09-24 PROCEDURE — 1123F ACP DISCUSS/DSCN MKR DOCD: CPT | Performed by: INTERNAL MEDICINE

## 2020-09-24 PROCEDURE — G8428 CUR MEDS NOT DOCUMENT: HCPCS | Performed by: INTERNAL MEDICINE

## 2020-09-24 PROCEDURE — 99214 OFFICE O/P EST MOD 30 MIN: CPT | Performed by: INTERNAL MEDICINE

## 2020-09-24 PROCEDURE — G8420 CALC BMI NORM PARAMETERS: HCPCS | Performed by: INTERNAL MEDICINE

## 2020-09-24 PROCEDURE — 4040F PNEUMOC VAC/ADMIN/RCVD: CPT | Performed by: INTERNAL MEDICINE

## 2020-09-24 PROCEDURE — 1036F TOBACCO NON-USER: CPT | Performed by: INTERNAL MEDICINE

## 2020-09-24 ASSESSMENT — ENCOUNTER SYMPTOMS
WHEEZING: 0
TROUBLE SWALLOWING: 0
CONSTIPATION: 1
NAUSEA: 1
PHOTOPHOBIA: 0
VOMITING: 0
COUGH: 1
SHORTNESS OF BREATH: 0
ABDOMINAL PAIN: 1

## 2020-09-24 NOTE — PROGRESS NOTES
[address] [facility]  1940  male  79 y.o. SUBJECTIVE:       Chief Complaint   Patient presents with    Flank Pain     gone--wanting to talk about GI results. given ampicillin, clrithromycin and omeprazole. some relief     Sinus Problem     increased mucus        HPI:  Patient was recently underwent upper endoscopy and colonoscopy. He was found to have H. pylori positive. He has been started triple therapy 2 days ago. He feels his epigastric pain is better. However he is complaining of more pain at the left flank area. Patient is complaining of decreased appetite as well as significant weight loss over the last couple of months. Status post colonoscopy. He also have history is a diagnosis of focal colitis  He is also having difficulty in remembering things. He is also complaining of cough congestion nasal drainage.       Past Medical History:   Diagnosis Date    DVT (deep venous thrombosis) (HCC)     Dysuria     Hypertension     Prostate enlargement      Past Surgical History:   Procedure Laterality Date    PROSTATE SURGERY       Social History     Socioeconomic History    Marital status:      Spouse name: None    Number of children: 3    Years of education: None    Highest education level: None   Occupational History    Occupation: retired    Social Needs    Financial resource strain: None    Food insecurity     Worry: None     Inability: None    Transportation needs     Medical: None     Non-medical: None   Tobacco Use    Smoking status: Former Smoker     Packs/day: 2.00     Years: 12.00     Pack years: 24.00     Types: Cigarettes     Last attempt to quit: 1972     Years since quittin.6    Smokeless tobacco: Never Used    Tobacco comment: started to smoke at 26 / smoked up to 2 ppd    Substance and Sexual Activity    Alcohol use: No    Drug use: No    Sexual activity: Yes   Lifestyle    Physical activity     Days per week: None     Minutes per session: None  Stress: None   Relationships    Social connections     Talks on phone: None     Gets together: None     Attends Confucianism service: None     Active member of club or organization: None     Attends meetings of clubs or organizations: None     Relationship status: None    Intimate partner violence     Fear of current or ex partner: None     Emotionally abused: None     Physically abused: None     Forced sexual activity: None   Other Topics Concern    None   Social History Narrative    None     Family History   Problem Relation Age of Onset    Asthma Sister     Other Sister        Review of Systems   Constitutional: Positive for appetite change and unexpected weight change. Negative for diaphoresis. HENT: Positive for congestion. Negative for trouble swallowing. Eyes: Negative for photophobia and visual disturbance. Respiratory: Positive for cough. Negative for shortness of breath and wheezing. Cardiovascular: Negative for chest pain and palpitations. Gastrointestinal: Positive for abdominal pain, constipation and nausea. Negative for vomiting. Endocrine: Negative for polyphagia and polyuria. Genitourinary: Positive for frequency and urgency. Negative for flank pain and hematuria. Neurological: Negative for dizziness, light-headedness and headaches. Hematological: Negative for adenopathy. Does not bruise/bleed easily. Psychiatric/Behavioral: Negative for agitation and hallucinations. OBJECTIVE:  Pulse Readings from Last 4 Encounters:   09/24/20 90   09/10/20 81   08/05/20 84   08/03/20 76     Wt Readings from Last 4 Encounters:   09/24/20 174 lb (78.9 kg)   09/10/20 178 lb 9.6 oz (81 kg)   08/05/20 192 lb (87.1 kg)   07/14/20 197 lb (89.4 kg)     BP Readings from Last 4 Encounters:   09/24/20 112/82   09/10/20 (!) 143/88   08/05/20 132/80   08/03/20 130/71     Physical Exam  Vitals signs and nursing note reviewed. Constitutional:       Appearance: Normal appearance.  He is not Jolie Orlando 06/04/2020     HBA1C:   Lab Results   Component Value Date    LABA1C 5.8 06/04/2020    .8 06/04/2020     MICRO/ALB:   Lab Results   Component Value Date    LABMICR Not Indicated 06/04/2020     LIPID:  Lab Results   Component Value Date    CHOL 192 11/15/2019    TRIG 61 11/15/2019    HDL 62 06/04/2020    LDLCALC 121 06/04/2020    LABVLDL 9 06/04/2020     TSH:   Lab Results   Component Value Date    TSHREFLEX 0.85 11/15/2019       ASSESSMENT/PLAN:    1. Generalized abdominal pain    2. Weight loss    3. Cough    4. Screening PSA (prostate specific antigen)    5. H. pylori infection    6. Chronic gastritis without bleeding, unspecified gastritis type      S/p recent H. pylori infection and chronic active gastritis. Patient will complete triple antibiotic therapy. He will also make follow-up appointment with gastroenterologist..  Follow-up visit in 3 weeks. Patient is having short-term memory loss. Will need Mini-Mental exam  Concerned about patient quick weight loss, need to exclude underlying malignancy.     Orders Placed This Encounter   Procedures    CT CHEST ABDOMEN PELVIS W CONTRAST     Standing Status:   Future     Standing Expiration Date:   9/24/2021     Order Specific Question:   Additional Contrast?     Answer:   Radiologist Recommendation     Order Specific Question:   Reason for exam:     Answer:   exclude ocult malignancy    Psa screening     Standing Status:   Future     Number of Occurrences:   1     Standing Expiration Date:   9/24/2021    CBC     Standing Status:   Future     Number of Occurrences:   1     Standing Expiration Date:   9/24/2021    COMPREHENSIVE METABOLIC PANEL     Standing Status:   Future     Number of Occurrences:   1     Standing Expiration Date:   9/24/2021    Urinalysis     Standing Status:   Future     Number of Occurrences:   1     Standing Expiration Date:   9/24/2021     Current Outpatient Medications   Medication Sig Dispense Refill    losartan-hydroCHLOROthiazide (HYZAAR) 50-12.5 MG per tablet Take 1/2 (one-half) tablet by mouth once daily 45 tablet 1    blood glucose monitor strips Test 1 times a day & as needed for symptoms of irregular blood glucose. Dispense sufficient amount for indicated testing frequency plus additional to accommodate PRN testing needs. 100 strip 0    Cyanocobalamin (B-12) 2500 MCG TABS Take by mouth daily      pantoprazole (PROTONIX) 40 MG tablet Take 1 tablet by mouth every morning (before breakfast) 30 tablet 0    glucose monitoring kit (FREESTYLE) monitoring kit 1 kit by Does not apply route daily 1 kit 0    atorvastatin (LIPITOR) 20 MG tablet Take 1 tablet by mouth daily 90 tablet 1    metFORMIN (GLUCOPHAGE XR) 750 MG extended release tablet Take 1 tablet by mouth daily (with breakfast) 90 tablet 1    Zinc 50 MG TABS Take by mouth      Ascorbic Acid (VITAMIN C) 1000 MG tablet Take 2,000 mg by mouth 2 times daily      Misc Natural Products (GLUCOSAMINE-CHONDROITIN DS) TABS Take by mouth 2 times daily      Coenzyme Q10 (COQ10) 100 MG CAPS Take by mouth daily      Calcium Carbonate (CALCIUM 600 PO) Take by mouth daily      magnesium oxide (MAG-OX) 400 MG tablet Take 400 mg by mouth daily      Selenium 200 MCG TABS Take by mouth nightly      Multiple Vitamins-Minerals (THERAPEUTIC MULTIVITAMIN-MINERALS) tablet Take 1 tablet by mouth daily      vitamin E 400 UNIT capsule Take 400 Units by mouth daily      B Complex Vitamins (B COMPLEX 1 PO) Take by mouth daily Plus B12 vitamin       No current facility-administered medications for this visit. Return in about 3 weeks (around 10/15/2020) for minimental exam, weight loss, abdominal pain. An After Visit Summary was printed and given to the patient. Documentation was done using voice recognition dragon software. Every effort was made to ensure accuracy; however, inadvertent  Unintentional computerized transcription errors may be present.

## 2020-09-24 NOTE — PATIENT INSTRUCTIONS
Patient Education        H. Pylori Bacterial Infection: Care Instructions  Your Care Instructions     Your test shows the presence of Helicobacter pylori ( H. pylori), a kind of bacterium that lives in the lining of the stomach. Many people have H. pylori in their stomachs and do not develop problems. But sometimes H. pylori causes an upset stomach or a sore (ulcer) in the stomach lining. Most stomach ulcers are caused by H. pylori. Symptoms of an ulcer include gnawing or burning pain in the belly that can last minutes or hours. Eating food or taking antacids helps relieve the pain, but the symptoms may come back after a while. Antibiotic medicine can cure an H. pylori infection. Follow-up care is a key part of your treatment and safety. Be sure to make and go to all appointments, and call your doctor if you are having problems. It's also a good idea to know your test results and keep a list of the medicines you take. How can you care for yourself at home? · Take your antibiotics as directed. Do not stop taking them just because you feel better. You need to take the full course of antibiotics. · If your doctor prescribes other medicine, take it exactly as prescribed. Call your doctor if you think you are having a problem with your medicine. You will get more details on the specific medicine your doctor prescribes. · Eat a healthy, balanced diet. ? Eat smaller meals, and eat more often. Be sure to eat at least three meals a day. ? Avoid heavily spiced or greasy foods. ? Do not drink beverages that have caffeine if they bother your stomach. These include coffee, tea, and soda. · Do not smoke. Smoking slows the healing of your ulcer and can make an ulcer come back. If you need help quitting, talk to your doctor about stop-smoking programs and medicines. These can increase your chances of quitting for good. · Limit how much alcohol you drink.  Alcohol can slow healing of an ulcer and can make your symptoms worse.  · Wash your hands after going to the bathroom. · Avoid aspirin, ibuprofen, or other anti-inflammatory medicines, because they can irritate the stomach. If you need pain medicine, try acetaminophen (Tylenol). When should you call for help? [id number] anytime you think you may need emergency care. For example, call if:  · You vomit blood or what looks like coffee grounds. · Your stools are maroon or very bloody. Call your doctor now or seek immediate medical care if:  · You have new or worse belly pain. · You are vomiting. · Your stools are black and look like tar, or they have streaks of blood. Watch closely for changes in your health, and be sure to contact your doctor if:  · You do not get better as expected. Where can you learn more? Go to https://Pigit[employer]osenseeb[employer]. org and sign in to your [employer] account. Enter X304 in the "[employer] box to learn more about \"H. Pylori Bacterial Infection: Care Instructions. \"     If you do not have an account, please click on the \"Sign Up Now\" link. Current as of: August 12, 2019               Content Version: 12.5  © 5929-0018 [employer]wise, Incorporated. Care instructions adapted under license by [facility] ([facility]). If you have questions about a medical condition or this instruction, always ask your healthcare professional. [address] any warranty or liability for your use of this information.

## 2020-09-29 ENCOUNTER — HOSPITAL ENCOUNTER (EMERGENCY)
Age: 80
Discharge: HOME | End: 2020-09-29
Payer: MEDICARE

## 2020-09-29 ENCOUNTER — TELEPHONE (OUTPATIENT)
Dept: PULMONOLOGY | Age: 80
End: 2020-09-29

## 2020-09-29 VITALS
RESPIRATION RATE: 16 BRPM | TEMPERATURE: 96.5 F | SYSTOLIC BLOOD PRESSURE: 171 MMHG | BODY MASS INDEX: 23.43 KG/M2 | OXYGEN SATURATION: 99 % | WEIGHT: 173 LBS | HEIGHT: 72 IN | DIASTOLIC BLOOD PRESSURE: 97 MMHG | HEART RATE: 82 BPM

## 2020-09-29 PROCEDURE — 99283 EMERGENCY DEPT VISIT LOW MDM: CPT

## 2020-09-29 PROCEDURE — 6370000000 HC RX 637 (ALT 250 FOR IP): Performed by: PHYSICIAN ASSISTANT

## 2020-09-29 RX ORDER — OMEPRAZOLE 20 MG/1
20 CAPSULE, DELAYED RELEASE ORAL DAILY
COMMUNITY
End: 2020-11-03

## 2020-09-29 RX ORDER — ONDANSETRON 4 MG/1
4 TABLET, ORALLY DISINTEGRATING ORAL ONCE
Status: COMPLETED | OUTPATIENT
Start: 2020-09-29 | End: 2020-09-29

## 2020-09-29 RX ADMIN — ONDANSETRON 4 MG: 4 TABLET, ORALLY DISINTEGRATING ORAL at 20:08

## 2020-09-29 RX ADMIN — Medication 1 SPRAY: at 20:53

## 2020-09-29 NOTE — TELEPHONE ENCOUNTER
Pt informed that he should go to the ER since he has not been able to eat all day.  He stated he would go

## 2020-09-29 NOTE — ED NOTES
Patient states he hasn't had anything to eat today, he hasn't taken any of his medication today due to phlegm.      Roderick Hays RN  09/29/20 1900

## 2020-09-30 NOTE — ED PROVIDER NOTES
[address] [facility]        Pt Name: Sanju Poole  MRN: [id number]  [place] 1940  Date of evaluation: 9/29/2020  Provider: SILVANA Fiore  PCP: Prashant Franco MD    OWEN. I have evaluated this patient. My supervising physician was available for consultation. CHIEF COMPLAINT       Chief Complaint   Patient presents with    Dysphagia     pt states he is having difficulty swallowing due to phlegm in his throat that he can't clear. pt states it usually clears when he drinks hot water in the morning, but it didn't help today. HISTORY OF PRESENT ILLNESS   (Location, Timing/Onset, Context/Setting, Quality, Duration, Modifying Factors, Severity, Associated Signs and Symptoms)  Note limiting factors. Sanju Poole is a 79 y.o. male presents emergency department with concern for phlegm in his throat. Patient reports that for many years he is woken up with a sensation and phlegm in the back of his throat. He is usually able to gargle warm water and feel relief. Today he tried this and did not feel the same relief. Has not tried to eat because of this sensation. He denies tongue lip or throat swelling, fever, chest pain, shortness of breath, abdominal pain, trismus, neck pain or stiffness, abdominal pain, nausea vomiting, numbness, weakness. Nursing Notes were all reviewed and agreed with or any disagreements were addressed in the HPI. REVIEW OF SYSTEMS    (2-9 systems for level 4, 10 or more for level 5)     Review of Systems    Positives and Pertinent negatives as per HPI. Except as noted above in the ROS, all other systems were reviewed and negative.        PAST MEDICAL HISTORY     Past Medical History:   Diagnosis Date    DVT (deep venous thrombosis) (HCC)     Dysuria     Hypertension     Prostate enlargement          SURGICAL HISTORY     Past Surgical History:   Procedure Laterality Date    PROSTATE SURGERY [address]       Discharge Medication List as of 9/29/2020  8:53 PM      CONTINUE these medications which have NOT CHANGED    Details   omeprazole (PRILOSEC) 20 MG delayed release capsule Take 20 mg by mouth DailyHistorical Med      losartan-hydroCHLOROthiazide (HYZAAR) 50-12.5 MG per tablet Take 1/2 (one-half) tablet by mouth once daily, Disp-45 tablet,R-1Normal      blood glucose monitor strips Test 1 times a day & as needed for symptoms of irregular blood glucose. Dispense sufficient amount for indicated testing frequency plus additional to accommodate PRN testing needs. , Disp-100 strip,R-0, Normal      Cyanocobalamin (B-12) 2500 MCG TABS Take by mouth dailyHistorical Med      glucose monitoring kit (FREESTYLE) monitoring kit DAILY Starting Fri 6/26/2020, Disp-1 kit, R-0, Print      atorvastatin (LIPITOR) 20 MG tablet Take 1 tablet by mouth daily, Disp-90 tablet, R-1Normal      metFORMIN (GLUCOPHAGE XR) 750 MG extended release tablet Take 1 tablet by mouth daily (with breakfast), Disp-90 tablet, R-1Normal      Zinc 50 MG TABS Take by mouthHistorical Med      Ascorbic Acid (VITAMIN C) 1000 MG tablet Take 2,000 mg by mouth 2 times dailyHistorical Med      Misc Natural Products (GLUCOSAMINE-CHONDROITIN DS) TABS 2 TIMES DAILY, Historical Med      Coenzyme Q10 (COQ10) 100 MG CAPS Take by mouth dailyHistorical Med      Calcium Carbonate (CALCIUM 600 PO) Take by mouth dailyHistorical Med      magnesium oxide (MAG-OX) 400 MG tablet Take 400 mg by mouth dailyHistorical Med      Selenium 200 MCG TABS Take by mouth nightlyHistorical Med      Multiple Vitamins-Minerals (THERAPEUTIC MULTIVITAMIN-MINERALS) tablet Take 1 tablet by mouth dailyHistorical Med      vitamin E 400 UNIT capsule Take 400 Units by mouth dailyHistorical Med      B Complex Vitamins (B COMPLEX 1 PO) Take by mouth daily Plus B12 vitaminHistorical Med      pantoprazole (PROTONIX) 40 MG tablet Take 1 tablet by mouth every morning (before Zofran and Chloraseptic Spray provided in the emergency room, he was able to tolerate liquids without any difficulty. Low concern for airway or esophageal obstruction. Appropriate for discharge with outpatient follow-up. Instructed to follow-up with primary care provider tomorrow. Instructed to return the emergency room for new or worsening symptoms including but not into fever, chest pain, shortness of breath, tongue or throat swelling, inability swallow solids or liquids, excessive drooling or spitting, limited opening of jaw, limb or painful movement of neck, any other symptoms he is concerned about. Verbal and written discharge instructions and return precautions given. FINAL IMPRESSION      1.  Post-nasal drip          DISPOSITION/PLAN   DISPOSITION Decision To Discharge 09/29/2020 08:53:13 PM      PATIENT REFERREDTO:  Sarah Rivera MD  [address] [place] [address]  [phone]    Call in 1 day        DISCHARGE MEDICATIONS:  Discharge Medication List as of 9/29/2020  8:53 PM          DISCONTINUED MEDICATIONS:  Discharge Medication List as of 9/29/2020  8:53 PM                 (Please note that portions of this note were completed with a voice recognition program.  Efforts were made to edit the dictations but occasionally words are mis-transcribed.)    SILVANA Michelle (electronically signed)         SILVANA Michelle  09/30/20 1589

## 2020-10-02 ENCOUNTER — HOSPITAL ENCOUNTER (OUTPATIENT)
Dept: CT IMAGING | Age: 80
Discharge: HOME | End: 2020-10-02
Payer: MEDICARE

## 2020-10-02 PROCEDURE — 6360000004 HC RX CONTRAST MEDICATION: Performed by: INTERNAL MEDICINE

## 2020-10-02 PROCEDURE — 74177 CT ABD & PELVIS W/CONTRAST: CPT

## 2020-10-02 RX ORDER — AMOXICILLIN 250 MG
1 CAPSULE ORAL DAILY
Qty: 30 TABLET | Refills: 0 | Status: SHIPPED | OUTPATIENT
Start: 2020-10-02 | End: 2021-04-13 | Stop reason: ALTCHOICE

## 2020-10-02 RX ORDER — WHEAT DEXTRIN 3 G/3.8 G
4 POWDER (GRAM) ORAL
Qty: 500 G | Refills: 2 | Status: SHIPPED | OUTPATIENT
Start: 2020-10-02 | End: 2021-04-13 | Stop reason: ALTCHOICE

## 2020-10-02 RX ADMIN — IOPAMIDOL 75 ML: 755 INJECTION, SOLUTION INTRAVENOUS at 09:40

## 2020-10-02 RX ADMIN — IOHEXOL 50 ML: 240 INJECTION, SOLUTION INTRATHECAL; INTRAVASCULAR; INTRAVENOUS; ORAL at 09:39

## 2020-10-07 ENCOUNTER — OFFICE VISIT (OUTPATIENT)
Dept: INTERNAL MEDICINE CLINIC | Age: 80
End: 2020-10-07
Payer: MEDICARE

## 2020-10-07 VITALS
SYSTOLIC BLOOD PRESSURE: 126 MMHG | WEIGHT: 170 LBS | TEMPERATURE: 98.3 F | DIASTOLIC BLOOD PRESSURE: 70 MMHG | HEART RATE: 78 BPM | BODY MASS INDEX: 23.03 KG/M2 | HEIGHT: 72 IN

## 2020-10-07 DIAGNOSIS — R63.4 WEIGHT LOSS: ICD-10-CM

## 2020-10-07 DIAGNOSIS — R05.9 COUGH: ICD-10-CM

## 2020-10-07 DIAGNOSIS — D72.819 LEUKOPENIA, UNSPECIFIED TYPE: ICD-10-CM

## 2020-10-07 LAB
ALBUMIN SERPL-MCNC: 4.1 G/DL (ref 3.4–5)
ALP BLD-CCNC: 26 U/L (ref 40–129)
ALT SERPL-CCNC: 15 U/L (ref 10–40)
AST SERPL-CCNC: 24 U/L (ref 15–37)
BASOPHILS ABSOLUTE: 0 K/UL (ref 0–0.2)
BASOPHILS RELATIVE PERCENT: 1.1 %
BILIRUB SERPL-MCNC: 1 MG/DL (ref 0–1)
BILIRUBIN DIRECT: <0.2 MG/DL (ref 0–0.3)
BILIRUBIN, INDIRECT: ABNORMAL MG/DL (ref 0–1)
BLOOD SMEAR REVIEW: NORMAL
EOSINOPHILS ABSOLUTE: 0 K/UL (ref 0–0.6)
EOSINOPHILS RELATIVE PERCENT: 1.7 %
HCT VFR BLD CALC: 40.8 % (ref 40.5–52.5)
HEMOGLOBIN: 13.6 G/DL (ref 13.5–17.5)
LYMPHOCYTES ABSOLUTE: 0.9 K/UL (ref 1–5.1)
LYMPHOCYTES RELATIVE PERCENT: 30.8 %
MCH RBC QN AUTO: 29.7 PG (ref 26–34)
MCHC RBC AUTO-ENTMCNC: 33.3 G/DL (ref 31–36)
MCV RBC AUTO: 89.2 FL (ref 80–100)
MONOCYTES ABSOLUTE: 0.3 K/UL (ref 0–1.3)
MONOCYTES RELATIVE PERCENT: 10.4 %
NEUTROPHILS ABSOLUTE: 1.6 K/UL (ref 1.7–7.7)
NEUTROPHILS RELATIVE PERCENT: 56 %
PDW BLD-RTO: 14.1 % (ref 12.4–15.4)
PLATELET # BLD: 193 K/UL (ref 135–450)
PMV BLD AUTO: 7.7 FL (ref 5–10.5)
RBC # BLD: 4.58 M/UL (ref 4.2–5.9)
TOTAL PROTEIN: 6.7 G/DL (ref 6.4–8.2)
WBC # BLD: 2.9 K/UL (ref 4–11)

## 2020-10-07 PROCEDURE — G8484 FLU IMMUNIZE NO ADMIN: HCPCS | Performed by: INTERNAL MEDICINE

## 2020-10-07 PROCEDURE — G8420 CALC BMI NORM PARAMETERS: HCPCS | Performed by: INTERNAL MEDICINE

## 2020-10-07 PROCEDURE — 1036F TOBACCO NON-USER: CPT | Performed by: INTERNAL MEDICINE

## 2020-10-07 PROCEDURE — G8427 DOCREV CUR MEDS BY ELIG CLIN: HCPCS | Performed by: INTERNAL MEDICINE

## 2020-10-07 PROCEDURE — 99214 OFFICE O/P EST MOD 30 MIN: CPT | Performed by: INTERNAL MEDICINE

## 2020-10-07 PROCEDURE — 1123F ACP DISCUSS/DSCN MKR DOCD: CPT | Performed by: INTERNAL MEDICINE

## 2020-10-07 PROCEDURE — 4040F PNEUMOC VAC/ADMIN/RCVD: CPT | Performed by: INTERNAL MEDICINE

## 2020-10-07 RX ORDER — MIRTAZAPINE 7.5 MG/1
7.5 TABLET, FILM COATED ORAL NIGHTLY
Qty: 90 TABLET | Refills: 1 | Status: SHIPPED | OUTPATIENT
Start: 2020-10-07 | End: 2021-01-07 | Stop reason: SDUPTHER

## 2020-10-07 ASSESSMENT — ENCOUNTER SYMPTOMS
NAUSEA: 0
TROUBLE SWALLOWING: 1
ABDOMINAL PAIN: 0
COUGH: 1
VOICE CHANGE: 0
EYE REDNESS: 0
PHOTOPHOBIA: 0
SHORTNESS OF BREATH: 0
WHEEZING: 0
VOMITING: 0

## 2020-10-07 NOTE — PROGRESS NOTES
[address] [facility]  1940  male  79 y.o. SUBJECTIVE:       Chief Complaint   Patient presents with    Lower Back Pain    Weight Loss     4 more #. HPI:  Patient initially made appointment for lower back pain. However after coming to the office he denies having any back pain. Apparently patient has been losing weight rapidly. He feels like he have less appetite and he is afraid that something might get stuck in his throat. He is afraid to eating. Recently diagnosed with H. pylori and completed a course of  meds except he forgot to take 1 day. Patient denies fever chills vomiting diarrhea. He does have constipation and evaluated by GI again few days ago. Patient denies any muscle weakness, tightness, fibrillation, double vision. He was evaluated by neurologist not long ago for neuropathy symptoms as well. His Paulding score today 21/30  Patient denies any dyspnea, shortness of breath, hemoptysis. He does have slight cough. He feels cough secondary to the drainage.         Past Medical History:   Diagnosis Date    DVT (deep venous thrombosis) (HCC)     Dysuria     Hypertension     Prostate enlargement      Past Surgical History:   Procedure Laterality Date    PROSTATE SURGERY       Social History     Socioeconomic History    Marital status:      Spouse name: None    Number of children: 3    Years of education: None    Highest education level: None   Occupational History    Occupation: retired    Social Needs    Financial resource strain: None    Food insecurity     Worry: None     Inability: None    Transportation needs     Medical: None     Non-medical: None   Tobacco Use    Smoking status: Former Smoker     Packs/day: 2.00     Years: 12.00     Pack years: 24.00     Types: Cigarettes     Last attempt to quit: 1972     Years since quittin.7    Smokeless tobacco: Never Used    Tobacco comment: started to smoke at 26 / smoked up to 2 ppd    Substance and Sexual 09/29/20 (!) 171/97   09/24/20 112/82   09/10/20 (!) 143/88     Physical Exam  Vitals signs reviewed. Constitutional:       Appearance: Normal appearance. He is not toxic-appearing or diaphoretic. Eyes:      Conjunctiva/sclera: Conjunctivae normal.   Cardiovascular:      Rate and Rhythm: Normal rate and regular rhythm. Pulses: Normal pulses. Heart sounds: Normal heart sounds. Pulmonary:      Effort: Pulmonary effort is normal.      Breath sounds: Normal breath sounds. No wheezing or rhonchi. Abdominal:      General: Abdomen is flat. Bowel sounds are normal.      Palpations: Abdomen is soft. Musculoskeletal:         General: No swelling, tenderness or deformity. Right lower leg: No edema. Left lower leg: No edema. Lymphadenopathy:      Cervical: No cervical adenopathy. Skin:     General: Skin is warm and dry. Capillary Refill: Capillary refill takes less than 2 seconds. Neurological:      General: No focal deficit present. Mental Status: He is alert and oriented to person, place, and time. Mental status is at baseline. Cranial Nerves: No cranial nerve deficit.       Gait: Gait normal.   Psychiatric:         Mood and Affect: Mood normal.         Behavior: Behavior normal.         CBC:   Lab Results   Component Value Date    WBC 3.1 09/24/2020    HGB 14.4 09/24/2020    HCT 42.6 09/24/2020     09/24/2020     CMP:  Lab Results   Component Value Date     09/24/2020    K 4.1 09/24/2020    CL 96 09/24/2020    CO2 29 09/24/2020    ANIONGAP 11 09/24/2020    GLUCOSE 116 09/24/2020    GLUCOSE 113 09/11/2020    BUN 14 09/24/2020    CREATININE 1.0 09/24/2020    GFRAA >60 09/24/2020    CALCIUM 9.5 09/24/2020    PROT 6.5 09/24/2020    LABALBU 4.0 09/24/2020    AGRATIO 1.6 09/24/2020    BILITOT 1.1 09/24/2020    ALKPHOS 27 09/24/2020    ALT 14 09/24/2020    AST 21 09/24/2020    GLOB 2.5 09/24/2020     URINALYSIS:  Lab Results   Component Value Date    GLUCOSEU Negative 09/24/2020    KETUA Negative 09/24/2020    SPECGRAV 1.012 09/24/2020    BLOODU Negative 09/24/2020    PHUR 7.0 09/24/2020    PROTEINU Negative 09/24/2020    NITRU Negative 09/24/2020    LEUKOCYTESUR Negative 09/24/2020    LABMICR Not Indicated 09/24/2020    URINETYPE [employer] 09/24/2020     HBA1C:   Lab Results   Component Value Date    LABA1C 5.8 06/04/2020    .8 06/04/2020     MICRO/ALB:   Lab Results   Component Value Date    LABMICR Not Indicated 09/24/2020     LIPID:  Lab Results   Component Value Date    CHOL 192 11/15/2019    TRIG 61 11/15/2019    HDL 62 06/04/2020    LDLCALC 121 06/04/2020    LABVLDL 9 06/04/2020     TSH:   Lab Results   Component Value Date    TSHREFLEX 0.85 11/15/2019     PSA:   Lab Results   Component Value Date    PSA 2.79 09/24/2020        ASSESSMENT/PLAN:    Alverto Romero was seen today for lower back pain and weight loss. Diagnoses and all orders for this visit:    Weight loss  -     HIV Screen; Future  -     ROSA - Jill Herzog MD, Oncology, [facility]  -     HEPATIC FUNCTION PANEL; Future  -     FL MODIFIED BARIUM SWALLOW W VIDEO; Future    Leukopenia, unspecified type  -     Path Review, Smear; Future  -     HIV Screen; Future  -     ROSA Herzog MD, Oncology, [facility]  -     HEPATIC FUNCTION PANEL; Future    Cough  -     HIV Screen; Future  -     Quantiferon, Incubated; Future  -     CBC Auto Differential; Future    Dementia without behavioral disturbance, unspecified dementia type ([facility] [address]-  We will request neurology follow-up appointment. Will send copy of the chart to Dr. Pedro Serna. Oral phase dysphagia  -     Jared Townsend MD, Otolaryngology, [facility]  -     FL ESOPHAGRAM; Future  -     FL MODIFIED BARIUM SWALLOW W VIDEO; Future    Insomnia, unspecified type    -     mirtazapine (REMERON) 7.5 MG tablet; Take 1 tablet by mouth nightly   Discussed use, benefit, and side effects of prescribed medications. Pt voiced understanding.

## 2020-10-07 NOTE — Clinical Note
Dr. Corine Wolf you have seen this patient not long ago. He is having rapidly progressive dementia, weight loss, dysphagia. He continues to have neuropathic symptoms. Would you please reevaluate him?   Thanks

## 2020-10-08 LAB
HEMATOLOGY PATH CONSULT: NORMAL
HIV AG/AB: NORMAL
HIV ANTIGEN: NORMAL
HIV-1 ANTIBODY: NORMAL
HIV-2 AB: NORMAL

## 2020-10-12 LAB
QUANTI TB GOLD PLUS: NEGATIVE
QUANTI TB1 MINUS NIL: 0 IU/ML (ref 0–0.34)
QUANTI TB2 MINUS NIL: 0 IU/ML (ref 0–0.34)
QUANTIFERON MITOGEN: >10 IU/ML
QUANTIFERON NIL: 0.02 IU/ML

## 2020-10-14 ENCOUNTER — HOSPITAL ENCOUNTER (OUTPATIENT)
Dept: GENERAL RADIOLOGY | Age: 80
Discharge: HOME | End: 2020-10-14
Payer: MEDICARE

## 2020-10-14 PROCEDURE — 92611 MOTION FLUOROSCOPY/SWALLOW: CPT

## 2020-10-14 PROCEDURE — 74230 X-RAY XM SWLNG FUNCJ C+: CPT

## 2020-10-14 PROCEDURE — 74220 X-RAY XM ESOPHAGUS 1CNTRST: CPT

## 2020-10-14 PROCEDURE — 92526 ORAL FUNCTION THERAPY: CPT

## 2020-10-14 NOTE — PROCEDURES
[facility] SPEECH THERAPY  MODIFIED BARIUM SWALLOW EVALUATION    Patient's Name: Sangeeta SIDHU. O.B: 1940  Medical Diagnosis: Oral phase dysphagia [R13.11]  Weight loss [R63.4]  Treatment Diagnosis: Dysphagia    Ordering MD: Dr. Nicol Rios  Radiologist: Dr. Simran Chowdhury  Date of Evaluation: 10/14/2020  Type of Study: Modified Barium Swallowing Study (MBS)  Diet Prior to Study:  Soft solids with thin liquids  Pain Level:  Pt did not report pain    Impression:  Modified Barium Swallow evaluation completed on 10/14/2020. Results indicate mild oropharyngeal dysphagia characterized by effortful bolus transfer with one instance of flash laryngeal penetration from backflow of material from UES. Thin liquids via cup and straw revealed timely swallow initiation with prolonged pharyngeal constriction noted and use of 1-2 swallows to clear. Occasional minimal backflow was noted from UES with minimal flash laryngeal penetration of backflowed material. This was self-clearing with no aspiration viewed. No overt difficulty was noted with puree textures, adequate oral and pharyngeal clearing was noted. Soft solids revealed prolonged mastication, adequate oral and pharyngeal clearing was achieved. Regular solids were not trialed due to Pt hesitancy to consume. Overall, Pt's swallow function is mildly impaired with possible globus sensation due to prolonged pharyngeal constriction/holding during the swallow.      Aspiration/Penetration Risk:  Minimal risk with thin liquids    Recommendations:    Diet Level: Soft regular texture diet with thin liquids, medication as tolerated (Pt was hesitant to consume hard solids)    Strategies: Upright 90 degrees at meals  Treatments: No speech therapy for dysphagia treatment is indicated at this time    Consistencies given: Thin, Puree, Soft solid (Additional consistencies not trialed as Pt was hesitant to consume)     Oral Phase  -Prolonged mastication    Pharyngeal Phase  -Effortful bolus transfer  -Prolonged pharyngeal constriction/holding  -Use of multiple swallows  -Reduced UES opening resulting in occasional minimal backflow  -Occasional minimal flash laryngeal penetration with thin liquids due to backflow from UES, self-clearing  -No aspiration was viewed with any texture    Esophageal Phase  -Pt had esophagram following MBS, please refer to report for details     Following Evaluation:  Results/recommendations and education given to Patient who verbalized understanding    Timed Code Treatment: 0 minutes    Total Treatment Time: 35 minutes    Negar Dennis., [address] [place] [address]  Speech-Language Pathologist

## 2020-10-19 ENCOUNTER — TELEPHONE (OUTPATIENT)
Dept: ENT CLINIC | Age: 80
End: 2020-10-19

## 2020-10-19 ENCOUNTER — OFFICE VISIT (OUTPATIENT)
Dept: ENT CLINIC | Age: 80
End: 2020-10-19
Payer: MEDICARE

## 2020-10-19 VITALS — SYSTOLIC BLOOD PRESSURE: 132 MMHG | TEMPERATURE: 97.6 F | HEART RATE: 83 BPM | DIASTOLIC BLOOD PRESSURE: 71 MMHG

## 2020-10-19 PROBLEM — R09.89 PHLEGM IN THROAT: Status: ACTIVE | Noted: 2020-10-19

## 2020-10-19 PROBLEM — H61.23 IMPACTED CERUMEN OF BOTH EARS: Status: ACTIVE | Noted: 2020-10-19

## 2020-10-19 PROCEDURE — G8427 DOCREV CUR MEDS BY ELIG CLIN: HCPCS | Performed by: OTOLARYNGOLOGY

## 2020-10-19 PROCEDURE — 99203 OFFICE O/P NEW LOW 30 MIN: CPT | Performed by: OTOLARYNGOLOGY

## 2020-10-19 PROCEDURE — G8420 CALC BMI NORM PARAMETERS: HCPCS | Performed by: OTOLARYNGOLOGY

## 2020-10-19 PROCEDURE — 1036F TOBACCO NON-USER: CPT | Performed by: OTOLARYNGOLOGY

## 2020-10-19 PROCEDURE — 4040F PNEUMOC VAC/ADMIN/RCVD: CPT | Performed by: OTOLARYNGOLOGY

## 2020-10-19 PROCEDURE — G8484 FLU IMMUNIZE NO ADMIN: HCPCS | Performed by: OTOLARYNGOLOGY

## 2020-10-19 PROCEDURE — 1123F ACP DISCUSS/DSCN MKR DOCD: CPT | Performed by: OTOLARYNGOLOGY

## 2020-10-19 NOTE — PATIENT INSTRUCTIONS
1. Use Debrox 4 drops in both ears three times a day for 4 days before returning for ear cleaning. 2. Take Mucinex 1200 mg twice daily, about 12 hours apart.   (blue box, one maximum strength tablet or two regular strength tablets)

## 2020-10-19 NOTE — PROGRESS NOTES
History:   Procedure Laterality Date    PROSTATE SURGERY           EXAMINATION:    Vitals:    10/19/20 1005   BP: 132/71   Pulse: 83   Temp: 97.6 °F (36.4 °C)   TempSrc: Oral     VITALS SIGNS were reviewed. GENERAL APPEARANCE: WDWN NAD, Alert and oriented X 3. EYES:  Extraocular motion was intact, bilaterally. Normal primary gaze alignment. Sclera and conjunctiva clear bilaterally. ABILITY TO COMMUNICATE/QUALITY OF VOICE:  Normal, no hoarseness or hot potato quality. SALIVARY GLANDS:  Parotid gland and submandibular gland normal bilaterally. INSPECTION, HEAD AND FACE:  Normal with no masses, lesions, tenderness, or deformities. FACIAL STRENGTH, MOTION:  Facial nerve function intact and equal bilaterally for all 5 branches. SINUSES:  Frontal sinuses and maxillary sinuses nontender, bilaterally. EXTERNAL EAR/NOSE:  The pinnae, mastoids, and external nose were normal bilaterally. EARS, OTOSCOPY: There was cerumen impaction occluding the external auditory canals bilaterally. Tympanic membranes could not be visualized. NOSE:  The nasal septum was midline. The inferior turbinates were normal bilaterally. Nasal mucosa and nasal secretions were normal bilaterally. LIPS, TEETH AND GUMS:  Normal.  OROPHARYNX/ORAL CAVITY:  Normal mucosa with no ulcerations, masses, lesions, erythema, exudate, or other abnormalities. (+) INDIRECT LARYNGOSCOPY:  Visualization was suboptimal due to a strong gag reflex and guarding. The base of tongue and epiglottis appeared to be normal.  There appeared to be erythema and edema in the posterior larynx. However, I was unable to visualize the vocal cords, hypopharynx, and endolarynx adequately. NECK:  Normal with no masses, tenderness, or tracheal deviation, and with normal laryngeal crepitus. THYROID:  Normal, with no nodules, goiter, or tenderness bilaterally. LYMPH NODES, CERVICAL, FACIAL AND SUPRACLAVICULAR:  No lymphadenopathy detected.         REVIEW OF IMAGES: I independently reviewed the images of the modified barium swallow and barium esophagram.  I agree with the radiologist's and speech therapist's interpretations. Narrative    EXAMINATION:    MODIFIED BARIUM SWALLOW WAS PERFORMED IN CONJUNCTION WITH SPEECH PATHOLOGY    SERVICES      FINDINGS:    No flynn aspiration.  An episode of laryngeal penetration was seen with thin    liquid though was not persistent.  Patient tolerated additional consistencies.              Impression    No flynn aspiration.         Please see separate speech pathology report for full discussion of findings    and recommendations.                 [facility] SPEECH THERAPY  MODIFIED BARIUM SWALLOW EVALUATION     Patient's Name: Brenda Anders  YOB: 1940  Medical Diagnosis: Oral phase dysphagia [R13.11]  Weight loss [R63.4]  Treatment Diagnosis: Dysphagia     Ordering MD: Dr. Margo Rios  Radiologist: Dr. Hoskins Honor  Date of Evaluation: 10/14/2020  Type of Study: Modified Barium Swallowing Study (MBS)  Diet Prior to Study:  Soft solids with thin liquids  Pain Level:  Pt did not report pain     Impression:  Modified Barium Swallow evaluation completed on 10/14/2020. Results indicate mild oropharyngeal dysphagia characterized by effortful bolus transfer with one instance of flash laryngeal penetration from backflow of material from UES. Thin liquids via cup and straw revealed timely swallow initiation with prolonged pharyngeal constriction noted and use of 1-2 swallows to clear. Occasional minimal backflow was noted from UES with minimal flash laryngeal penetration of backflowed material. This was self-clearing with no aspiration viewed. No overt difficulty was noted with puree textures, adequate oral and pharyngeal clearing was noted. Soft solids revealed prolonged mastication, adequate oral and pharyngeal clearing was achieved. Regular solids were not trialed due to Pt hesitancy to consume.  Overall, Pt's swallow function is mildly impaired with possible globus sensation due to prolonged pharyngeal constriction/holding during the swallow.      Aspiration/Penetration Risk:  Minimal risk with thin liquids     Recommendations:    Diet Level: Soft regular texture diet with thin liquids, medication as tolerated (Pt was hesitant to consume hard solids)     Strategies: Upright 90 degrees at meals  Treatments: No speech therapy for dysphagia treatment is indicated at this time        Narrative    EXAMINATION:    DOUBLE CONTRAST ESOPHAGRAM         10/14/2020 8:32 am      FINDINGS:    Barium was given to patient by mouth.  Mild esophageal dysmotility was    demonstrated.  No evidence of spontaneous gastroesophageal reflux during the    exam.  Reflux was not elicited on provocative maneuvers.  13 mm barium pill    was administered which passed to the stomach without difficulty.  Prompt    transit of contrast was seen from stomach to small bowel.              Impression    Mild esophageal dysmotility.         No evidence of functional obstruction or gross reflux.                 IMPRESSION / DIAGNOSES / ORDERS:       Elicia Washington was seen today for other. Diagnoses and all orders for this visit:    Phlegm in throat  Comments:  Excessive. Impacted cerumen of both ears    Oropharyngeal dysphagia           RECOMMENDATIONS/PLAN:      Patient Instructions   1. Use Debrox 4 drops in both ears three times a day for 4 days before returning for ear cleaning. 2. Take Mucinex 1200 mg twice daily, about 12 hours apart. (blue box, one maximum strength tablet or two regular strength tablets)         Follow-up  Return in 2 weeks (on 11/2/2020) for flexible fiberoptic laryngoscopy and cleaning of ears.

## 2020-10-20 ENCOUNTER — TELEPHONE (OUTPATIENT)
Dept: PULMONOLOGY | Age: 80
End: 2020-10-20

## 2020-10-20 NOTE — TELEPHONE ENCOUNTER
Please speak to patient and advise him that he will need to post that question to the physician treating him for the gastritis. He should take it unless his PCP or gastroenterologist advise him not to take it because of the gastritis.

## 2020-10-20 NOTE — TELEPHONE ENCOUNTER
Dr Renetta Zarate office called and wants you to call Dr Beatriz Matute on his cell phone [phone] regarding this pt

## 2020-11-02 ENCOUNTER — OFFICE VISIT (OUTPATIENT)
Dept: ENT CLINIC | Age: 80
End: 2020-11-02
Payer: MEDICARE

## 2020-11-02 VITALS
HEART RATE: 75 BPM | BODY MASS INDEX: 22.08 KG/M2 | TEMPERATURE: 97.9 F | HEIGHT: 72 IN | WEIGHT: 163 LBS | OXYGEN SATURATION: 98 % | SYSTOLIC BLOOD PRESSURE: 131 MMHG | DIASTOLIC BLOOD PRESSURE: 78 MMHG

## 2020-11-02 PROCEDURE — 31575 DIAGNOSTIC LARYNGOSCOPY: CPT | Performed by: OTOLARYNGOLOGY

## 2020-11-02 PROCEDURE — 69210 REMOVE IMPACTED EAR WAX UNI: CPT | Performed by: OTOLARYNGOLOGY

## 2020-11-02 NOTE — PATIENT INSTRUCTIONS
Laryngopharyngeal Reflux    Laryngopharyngeal reflux (LPR) is a condition in which stomach fluid refluxes, or flows backwards, up into your throat. This affects the back area of your voice box. It happens hundreds of times a day and involves a very small amount of fluid each time. There is no sensation or awareness of this reflux, such as the heartburn, pain, or indigestion associated with a related condition, gastroesophageal reflux, or GERD. These two conditions are distinctly different, although they may coexist in a given person. Generally, you will not be aware when LPR is happening. However, the stomach fluid contains an enzyme, called pepsin, which causes inflammation in your throat, which in turn, causes your symptoms. The most common symptoms associated with LPR reflux are a sensation of a lump in the throat or mucus that you cannot clear or something stuck in the throat, chronic cough, chronic hoarseness, sorethroat, and postnasal drainage. The most common sign of LPR is frequent throat clearing. The only effective treatment for this condition is use of a medication, called a proton pump inhibitor (PPI). This medication decreases the amount of acid your stomach lining makes and puts into the stomach fluid. This raises the pH of the stomach fluid. Under this condition the pepsin is less active when reflux does occur and it, therefore, does not irritate your throat as much. Research with omeprazole, one type of PPI medication, showed that twice daily therapy may be needed to provide maximum and optimum effect to control or improve these symptoms. In the treatment of this condition, 40 mg once daily DOES NOT EQUAL 20 mg twice daily! In addition, the PPI medication must be taken on an empty stomach to maximize digestion and absorption of the medication. Your stomach is empty when you have had nothing to eat or drink for the preceding two hours.   Further, for maximum effectiveness, you must eat a meal or snack about 45 minutes after each dose to maximize uptake from the bloodstream of the medication by the acid producing cells of the stomach in order to decrease the amount of acid your stomach makes. If this happens, the pH of the fluids in the stomach is increased from 2 to 4 and the pepsin enzyme is inactive, or less active, and causes less inflammation of your throat. This will decrease your symptoms. Twice daily therapy may be accomplished by taking the medication first thing each morning and delaying your breakfast for about 45 minutes. Then, you can take the second dose of the medication 45 minutes before your dinner meal.  If you forget to take your pill before you eat dinner, wait until two hours after dinner and take your pill. Then, have a light snack or finish your dinner about 45 minutes later. Your primary physician or gastroenterologist may also be treating you for GERD. This treatment for LPR will not interfere with your GERD management. Please be aware of the possible side effects and interactions of Omeprazole (Prilosec), including, but not limited to:  allergic reaction, nausea, headache, diarrhea, increased risk of fractures wrists or hips or spine, increased risk of Clostridium difficile-associated diarrhea (CDAD), low serum magnesium, excess gas, bloating, stomach pain, constipation, dry mouth, blisters, or peeling skin. One study showed a possible association of omeprazole with increased risk of heart attack. Another study showed a possible association of omeprazole with kidney damage or disease. These potential adverse effects are considered to be very rare, if they actually occur at all secondary to these medications. A recent consensus statement of national gastroenterologist has found no increased incidence of these adverse effects with PPI medications. You should also discuss these with your primary care physician.   Also, read all information given by your pharmacist regarding your medications. Please ask if you have any additional questions.

## 2020-11-02 NOTE — PROGRESS NOTES
Chief Complaint   Patient presents with    Laryngitis     phlegm in throat, here for flex throat endoscopy       PROCEDURE #1 -  FLEXIBLE FIBEROPTIC NASOPHARYNGOLARYNGOSCOPY  INDICATION:  Inadequate visualization by indirect laryngoscopy mirror examination and need for detailed endoscopic examination of the larynx and pharynx to evaluate the upper aerodigestive tract for etiology of excessive phlegm in the throat. INFORMED CONSENT:  The procedure was described to the patient, including method of anesthesia. The patient was advised of the medical necessity for this procedure. The risks and potential complications were discussed, including, but not limited to, bleeding, infection, adverse reaction to medications, hoarseness, sore throat, inability to obtain adequate visualization, and future need for rigid operative endoscopy. The expected outcome, potential benefits and the alternatives of therapy were discussed. Del asked appropriate questions and then expressed the lack of any further questions, understanding, acceptance, and the desire to undergo with this procedure, granting verbal informed consent. FINDINGS:  There was mild edema and erythema of the arytenoid and interarytenoid mucosa consistent with posterior laryngitis secondary to laryngopharyngeal reflux. The vocal cords appeared to be normal, with no nodule, ulceration, polyp, leukoplakia or other lesions, and appeared to be normally mobile bilaterally with midline approximation on phonation. Sensation of the hypopharynx and larynx appeared to be normal when touched by the end of the flexible scope. The nasopharynx, eustachian tube orifices and fossa of Rosenmüller, oropharynx, base of tongue, hypopharynx, supraglottis, subglottis, and piriform sinuses all appeared to be normal, with no lesions. Visualization was excellent throughout the examination.        DESCRIPTION OF PROCEDURE:  The right and left nasal cavity was topically anesthetized and decongested with a 50-50 mixture of 0.05% oxymetazoline solution and topical 4% lidocaine solution by nasal sprayer, twice. After about ten minutes, the flexible fiberoptic nasopharyngolaryngoscope, with camera attached, was inserted through the right nare/nasal cavity and advanced to the nasopharynx and then to the hypopharynx and larynx. The [employer] video system was used. After adequate endoscopic visualization, the endoscope was removed. The patient appeared to tolerate the procedure well with no evidence of perioperative complications. PROCEDURE #2 - REMOVAL OF CERUMEN IMPACTION (41748 bilateral): Obstructing cerumen impaction occluding both of the EACs, left partial and right total, and obscuring visualization of the TMs, was removed under otomicroscopic visualization, with instrumentation, using a cerumen Billeau wire loop, left ear and suction in the right ear. The EACs appeared to be normal.  The tympanic membranes appeared to be normal.  Del reported improved hearing, back to usual normal level, after cerumen removal.        BOBBY / Jesse Lutz / Veronica Jamison was seen today for laryngitis. Diagnoses and all orders for this visit:    LPRD (laryngopharyngeal reflux disease)    Chronic laryngitis    Chronic pharyngitis    Phlegm in throat    Impacted cerumen of both ears    Conductive hearing loss of right ear, unspecified hearing status on contralateral side        RECOMMENDATIONS / PLAN    1. Patient was advised to increase pantoprazole to twice daily, on an empty stomach, and eat a meal or snack 45 to 60 minutes after taking each pill. He was advised to call for a refill when needed, prior to next visit. 2. See patient instructions, on file for this visit, which were fully discussed with the patient. 3. Return in about 3 months (around 2/2/2021) for repeat flexible fiberoptic throat endoscopy, recheck, follow-up and sooner if condition worsens.

## 2020-11-03 PROBLEM — J37.0 CHRONIC LARYNGITIS: Status: ACTIVE | Noted: 2020-11-03

## 2020-11-03 PROBLEM — J31.2 CHRONIC PHARYNGITIS: Status: ACTIVE | Noted: 2020-11-03

## 2020-11-03 PROBLEM — K21.9 LPRD (LARYNGOPHARYNGEAL REFLUX DISEASE): Status: ACTIVE | Noted: 2020-11-03

## 2020-11-03 PROBLEM — H90.11 CONDUCTIVE HEARING LOSS OF RIGHT EAR: Status: ACTIVE | Noted: 2020-11-03

## 2020-11-27 RX ORDER — METFORMIN HYDROCHLORIDE 750 MG/1
TABLET, EXTENDED RELEASE ORAL
Qty: 90 TABLET | Refills: 0 | Status: SHIPPED | OUTPATIENT
Start: 2020-11-27 | End: 2021-01-07 | Stop reason: HOSPADM

## 2020-12-17 ENCOUNTER — TELEPHONE (OUTPATIENT)
Dept: PHARMACY | Facility: CLINIC | Age: 80
End: 2020-12-17

## 2020-12-29 ENCOUNTER — TELEPHONE (OUTPATIENT)
Dept: NEUROLOGY | Age: 80
End: 2020-12-29

## 2020-12-29 ENCOUNTER — OFFICE VISIT (OUTPATIENT)
Dept: NEUROLOGY | Age: 80
End: 2020-12-29
Payer: MEDICARE

## 2020-12-29 VITALS
WEIGHT: 157.2 LBS | RESPIRATION RATE: 14 BRPM | DIASTOLIC BLOOD PRESSURE: 70 MMHG | TEMPERATURE: 96.2 F | SYSTOLIC BLOOD PRESSURE: 120 MMHG | HEART RATE: 74 BPM | HEIGHT: 72 IN | BODY MASS INDEX: 21.29 KG/M2

## 2020-12-29 PROCEDURE — G8427 DOCREV CUR MEDS BY ELIG CLIN: HCPCS | Performed by: PSYCHIATRY & NEUROLOGY

## 2020-12-29 PROCEDURE — 4040F PNEUMOC VAC/ADMIN/RCVD: CPT | Performed by: PSYCHIATRY & NEUROLOGY

## 2020-12-29 PROCEDURE — G8420 CALC BMI NORM PARAMETERS: HCPCS | Performed by: PSYCHIATRY & NEUROLOGY

## 2020-12-29 PROCEDURE — 99214 OFFICE O/P EST MOD 30 MIN: CPT | Performed by: PSYCHIATRY & NEUROLOGY

## 2020-12-29 PROCEDURE — 1036F TOBACCO NON-USER: CPT | Performed by: PSYCHIATRY & NEUROLOGY

## 2020-12-29 PROCEDURE — 1123F ACP DISCUSS/DSCN MKR DOCD: CPT | Performed by: PSYCHIATRY & NEUROLOGY

## 2020-12-29 PROCEDURE — G8484 FLU IMMUNIZE NO ADMIN: HCPCS | Performed by: PSYCHIATRY & NEUROLOGY

## 2020-12-29 NOTE — PROGRESS NOTES
[facility]   Neurology followup     Subjective:   CC/HP  History was obtained from the patient. Additional history was obtained from his family. Interval history:  New symptom: Patient family was concerned about memory impairment. He has had this for several months now. Patient states that he is feeling fine. Patient states that his numbness is about the same. Detailed history:  Patient complains of tingling and numbness in his hands and feet. Hands are more involved than the feet. Symptoms started approximately around early March 2020. Patient states that the symptoms may be slightly improving. She has noticed some numbness at times in the right leg as well. Patient has diabetes which was diagnosed earlier this year and is on medication. Recent hemoglobin A1c is 5.8 and the diabetes is well controlled. Comorbidities include hypertension and a benign prostatic enlargement  Symptom onset was gradual.  Symptoms are persistent. The symptoms are mild to moderate in severity. No clear aggravating or relieving factors to symptoms     REVIEW OF SYSTEMS     Constitutional:  []? Chills   []? Fatigue   []? Fevers   []? Malaise   []? Weight loss     [x]? Denies all of the above     Respiratory:   []? Cough    []? Shortness of breath         [x]? Denies all of the above      Cardiovascular:   []? Chest pain    []? Exertional chest pressure/discomfort           []? Palpitations    []? Syncope     [x]?  Denies all of the above           Past Medical History        Past Medical History:   Diagnosis Date    DVT (deep venous thrombosis) ([facility])      Dysuria      Hypertension      Prostate enlargement          Family History         Family History   Problem Relation Age of Onset    Asthma Sister      Other Sister          Social History   Social History            Socioeconomic History    Marital status:        Spouse name: None    Number of children: 3    Years of education: None    Highest education level: None   Occupational History    Occupation: retired    Social Needs    Financial resource strain: None    Food insecurity       Worry: None       Inability: None    Transportation needs       Medical: None       Non-medical: None   Tobacco Use    Smoking status: Former Smoker       Packs/day: 2.00       Years: 12.00       Pack years: 24.00       Types: Cigarettes       Last attempt to quit: 1972       Years since quittin.6    Smokeless tobacco: Never Used    Tobacco comment: started to smoke at 26 / smoked up to 2 ppd    Substance and Sexual Activity    Alcohol use: No    Drug use: No    Sexual activity: Yes   Lifestyle    Physical activity       Days per week: None       Minutes per session: None    Stress: None   Relationships    Social connections       Talks on phone: None       Gets together: None       Attends [employer] service: None       Active member of club or organization: None       Attends meetings of clubs or organizations: None       Relationship status: None    Intimate partner violence       Fear of current or ex partner: None       Emotionally abused: None       Physically abused: None       Forced sexual activity: None   Other Topics Concern    None   Social History Narrative    None           Objective:  Exam:  BP (!) 143/88   Pulse 81   Ht 6' (1.829 m)   Wt 178 lb 9.6 oz (81 kg)   BMI 24.22 kg/m²   This is a well-nourished patient in no acute distress  Patient is awake, alert and oriented x3. Speech is normal.  Slightly impaired short-term memory   Pupils are equal round reacting to light. Extraocular movements intact. Face symmetrical. Tongue midline. Motor exam shows normal symmetrical strength. Deep tendon reflexes decreased in the legs. Plantar reflexes downgoing. 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 Coordination normal. Gait normal. No carotid bruit.  No neck stiffness.       Data :  LABS:  General Labs: CBC:   Lab Results   Component Value Date    WBC 2.9 10/07/2020    RBC 4.58 10/07/2020    HGB 13.6 10/07/2020    HCT 40.8 10/07/2020    MCV 89.2 10/07/2020    MCH 29.7 10/07/2020    MCHC 33.3 10/07/2020    RDW 14.1 10/07/2020     10/07/2020    MPV 7.7 10/07/2020     BMP:    Lab Results   Component Value Date     09/24/2020    K 4.1 09/24/2020    CL 96 09/24/2020    CO2 29 09/24/2020    BUN 14 09/24/2020    LABALBU 4.1 10/07/2020    CREATININE 1.0 09/24/2020    CALCIUM 9.5 09/24/2020    GFRAA >60 09/24/2020    LABGLOM >60 09/24/2020    GLUCOSE 116 09/24/2020    GLUCOSE 113 09/11/2020        Impression :  Mild cognitive impairment versus early dementia. 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 There was also evidence for right S1 radiculopathy. There was some minor vague neuropathic changes in the legs probably due to hyperglycemia  TSH and B12 levels were normal     Plan :  Discussed with patient and his family  Discussed about getting a CT of the head. This would help with rule out hydrocephalus and other lesions that can cause memory impairment. Patient is very reluctant but does not want to get it done at this time. I suggested that he discuss it further with his primary care physician.   I will see him on a as needed basis.     Please note a portion of  this chart was generated using dragon dictation software. Although every effort was made to ensure the accuracy of this automated transcription, some errors in transcription may have occurred.    .ne

## 2020-12-29 NOTE — TELEPHONE ENCOUNTER
Marcos Ybarra calling to  let Dr Niyah Cabrera know that he did have occasional pain in his right leg. There is no clear reason why it comes on. He said he thought the doctor meant does he have pain in leg right now, which he doesn't. But he does occasionally. Please call back with any advice.

## 2021-01-07 ENCOUNTER — TELEPHONE (OUTPATIENT)
Dept: INTERNAL MEDICINE CLINIC | Age: 81
End: 2021-01-07

## 2021-01-07 ENCOUNTER — OFFICE VISIT (OUTPATIENT)
Dept: INTERNAL MEDICINE CLINIC | Age: 81
End: 2021-01-07
Payer: MEDICARE

## 2021-01-07 VITALS
HEIGHT: 72 IN | TEMPERATURE: 96.2 F | DIASTOLIC BLOOD PRESSURE: 88 MMHG | WEIGHT: 153 LBS | HEART RATE: 72 BPM | SYSTOLIC BLOOD PRESSURE: 138 MMHG | BODY MASS INDEX: 20.72 KG/M2

## 2021-01-07 DIAGNOSIS — I10 ESSENTIAL HYPERTENSION: ICD-10-CM

## 2021-01-07 DIAGNOSIS — G30.1 LATE ONSET ALZHEIMER'S DISEASE WITHOUT BEHAVIORAL DISTURBANCE (HCC): ICD-10-CM

## 2021-01-07 DIAGNOSIS — F02.80 LATE ONSET ALZHEIMER'S DISEASE WITHOUT BEHAVIORAL DISTURBANCE (HCC): ICD-10-CM

## 2021-01-07 DIAGNOSIS — E11.9 TYPE 2 DIABETES MELLITUS WITHOUT COMPLICATION, WITHOUT LONG-TERM CURRENT USE OF INSULIN (HCC): Primary | ICD-10-CM

## 2021-01-07 DIAGNOSIS — R63.4 WEIGHT LOSS: ICD-10-CM

## 2021-01-07 DIAGNOSIS — E11.9 TYPE 2 DIABETES MELLITUS WITHOUT COMPLICATION, WITHOUT LONG-TERM CURRENT USE OF INSULIN (HCC): ICD-10-CM

## 2021-01-07 LAB
ANION GAP SERPL CALCULATED.3IONS-SCNC: 8 MMOL/L (ref 3–16)
BILIRUBIN URINE: NEGATIVE
BLOOD, URINE: NEGATIVE
BUN BLDV-MCNC: 22 MG/DL (ref 7–20)
CALCIUM SERPL-MCNC: 9.6 MG/DL (ref 8.3–10.6)
CHLORIDE BLD-SCNC: 94 MMOL/L (ref 99–110)
CLARITY: ABNORMAL
CO2: 34 MMOL/L (ref 21–32)
COLOR: YELLOW
COMMENT UA: ABNORMAL
CREAT SERPL-MCNC: 1 MG/DL (ref 0.8–1.3)
EPITHELIAL CELLS, UA: 0 /HPF (ref 0–5)
GFR AFRICAN AMERICAN: >60
GFR NON-AFRICAN AMERICAN: >60
GLUCOSE BLD-MCNC: 131 MG/DL (ref 70–99)
GLUCOSE URINE: NEGATIVE MG/DL
HCT VFR BLD CALC: 39.1 % (ref 40.5–52.5)
HEMOGLOBIN: 12.9 G/DL (ref 13.5–17.5)
HYALINE CASTS: 6 /LPF (ref 0–8)
KETONES, URINE: NEGATIVE MG/DL
LEUKOCYTE ESTERASE, URINE: NEGATIVE
MCH RBC QN AUTO: 30.6 PG (ref 26–34)
MCHC RBC AUTO-ENTMCNC: 33.1 G/DL (ref 31–36)
MCV RBC AUTO: 92.4 FL (ref 80–100)
MICROSCOPIC EXAMINATION: YES
NITRITE, URINE: NEGATIVE
PDW BLD-RTO: 13.7 % (ref 12.4–15.4)
PH UA: 7 (ref 5–8)
PLATELET # BLD: 168 K/UL (ref 135–450)
PMV BLD AUTO: 8 FL (ref 5–10.5)
POTASSIUM SERPL-SCNC: 3.6 MMOL/L (ref 3.5–5.1)
PROTEIN UA: NEGATIVE MG/DL
RBC # BLD: 4.23 M/UL (ref 4.2–5.9)
RBC UA: 8 /HPF (ref 0–4)
SODIUM BLD-SCNC: 136 MMOL/L (ref 136–145)
SPECIFIC GRAVITY UA: 1.02 (ref 1–1.03)
URINE TYPE: ABNORMAL
UROBILINOGEN, URINE: 1 E.U./DL
WBC # BLD: 2.5 K/UL (ref 4–11)
WBC UA: 1 /HPF (ref 0–5)

## 2021-01-07 PROCEDURE — G8484 FLU IMMUNIZE NO ADMIN: HCPCS | Performed by: INTERNAL MEDICINE

## 2021-01-07 PROCEDURE — G8510 SCR DEP NEG, NO PLAN REQD: HCPCS | Performed by: INTERNAL MEDICINE

## 2021-01-07 PROCEDURE — 3288F FALL RISK ASSESSMENT DOCD: CPT | Performed by: INTERNAL MEDICINE

## 2021-01-07 PROCEDURE — 1123F ACP DISCUSS/DSCN MKR DOCD: CPT | Performed by: INTERNAL MEDICINE

## 2021-01-07 PROCEDURE — G8420 CALC BMI NORM PARAMETERS: HCPCS | Performed by: INTERNAL MEDICINE

## 2021-01-07 PROCEDURE — 99214 OFFICE O/P EST MOD 30 MIN: CPT | Performed by: INTERNAL MEDICINE

## 2021-01-07 PROCEDURE — G8427 DOCREV CUR MEDS BY ELIG CLIN: HCPCS | Performed by: INTERNAL MEDICINE

## 2021-01-07 PROCEDURE — 1036F TOBACCO NON-USER: CPT | Performed by: INTERNAL MEDICINE

## 2021-01-07 PROCEDURE — 4040F PNEUMOC VAC/ADMIN/RCVD: CPT | Performed by: INTERNAL MEDICINE

## 2021-01-07 RX ORDER — LACTOSE-REDUCED FOOD
1 LIQUID (ML) ORAL 2 TIMES DAILY
Qty: 60 CAN | Refills: 2 | Status: SHIPPED | OUTPATIENT
Start: 2021-01-07 | End: 2021-03-12 | Stop reason: ALTCHOICE

## 2021-01-07 RX ORDER — MIRTAZAPINE 7.5 MG/1
7.5 TABLET, FILM COATED ORAL NIGHTLY
Qty: 90 TABLET | Refills: 1 | Status: SHIPPED | OUTPATIENT
Start: 2021-01-07 | End: 2021-04-13 | Stop reason: HOSPADM

## 2021-01-07 ASSESSMENT — ENCOUNTER SYMPTOMS
PHOTOPHOBIA: 0
NAUSEA: 0
VOICE CHANGE: 0
TROUBLE SWALLOWING: 0
VOMITING: 0
WHEEZING: 0
ABDOMINAL PAIN: 0
SHORTNESS OF BREATH: 0

## 2021-01-07 ASSESSMENT — PATIENT HEALTH QUESTIONNAIRE - PHQ9
1. LITTLE INTEREST OR PLEASURE IN DOING THINGS: 0
SUM OF ALL RESPONSES TO PHQ QUESTIONS 1-9: 0

## 2021-01-07 NOTE — PROGRESS NOTES
Minutes per session: None    Stress: None   Relationships    Social connections     Talks on phone: None     Gets together: None     Attends Christian service: None     Active member of club or organization: None     Attends meetings of clubs or organizations: None     Relationship status: None    Intimate partner violence     Fear of current or ex partner: None     Emotionally abused: None     Physically abused: None     Forced sexual activity: None   Other Topics Concern    None   Social History Narrative    None     Family History   Problem Relation Age of Onset    Asthma Sister     Other Sister        Review of Systems   Constitutional: Negative for diaphoresis and unexpected weight change. HENT: Negative for trouble swallowing and voice change. Eyes: Negative for photophobia and visual disturbance. Respiratory: Negative for shortness of breath and wheezing. Cardiovascular: Negative for chest pain and palpitations. Gastrointestinal: Negative for abdominal pain, nausea and vomiting. Neurological: Negative for dizziness and light-headedness. OBJECTIVE:  Pulse Readings from Last 4 Encounters:   01/07/21 72   12/29/20 74   11/02/20 75   10/19/20 83     Wt Readings from Last 4 Encounters:   01/07/21 153 lb (69.4 kg)   12/29/20 157 lb 3.2 oz (71.3 kg)   11/02/20 163 lb (73.9 kg)   10/07/20 170 lb (77.1 kg)     BP Readings from Last 4 Encounters:   01/07/21 138/88   12/29/20 120/70   11/02/20 131/78   10/19/20 132/71     Physical Exam  Constitutional:       General: He is not in acute distress. Eyes:      Conjunctiva/sclera: Conjunctivae normal.   Cardiovascular:      Rate and Rhythm: Normal rate and regular rhythm. Pulses: Normal pulses. Heart sounds: Normal heart sounds. Pulmonary:      Effort: Pulmonary effort is normal.      Breath sounds: Normal breath sounds. Abdominal:      General: Bowel sounds are normal.      Palpations: There is no mass. Tenderness:  There is no abdominal tenderness. There is no guarding or rebound. Neurological:      General: No focal deficit present. Mental Status: He is alert and oriented to person, place, and time. CBC:   Lab Results   Component Value Date    WBC 2.9 10/07/2020    HGB 13.6 10/07/2020    HCT 40.8 10/07/2020     10/07/2020     CMP:  Lab Results   Component Value Date     09/24/2020    K 4.1 09/24/2020    CL 96 09/24/2020    CO2 29 09/24/2020    ANIONGAP 11 09/24/2020    GLUCOSE 116 09/24/2020    GLUCOSE 113 09/11/2020    BUN 14 09/24/2020    CREATININE 1.0 09/24/2020    GFRAA >60 09/24/2020    CALCIUM 9.5 09/24/2020    PROT 6.7 10/07/2020    LABALBU 4.1 10/07/2020    AGRATIO 1.6 09/24/2020    BILITOT 1.0 10/07/2020    ALKPHOS 26 10/07/2020    ALT 15 10/07/2020    AST 24 10/07/2020    GLOB 2.5 09/24/2020     URINALYSIS:  Lab Results   Component Value Date    GLUCOSEU Negative 09/24/2020    KETUA Negative 09/24/2020    SPECGRAV 1.012 09/24/2020    BLOODU Negative 09/24/2020    PHUR 7.0 09/24/2020    PROTEINU Negative 09/24/2020    NITRU Negative 09/24/2020    LEUKOCYTESUR Negative 09/24/2020    LABMICR Not Indicated 09/24/2020    URINETYPE Cleancatch 09/24/2020     HBA1C:   Lab Results   Component Value Date    LABA1C 5.8 06/04/2020    .8 06/04/2020     MICRO/ALB:   Lab Results   Component Value Date    LABMICR Not Indicated 09/24/2020     LIPID:  Lab Results   Component Value Date    CHOL 192 11/15/2019    TRIG 61 11/15/2019    HDL 62 06/04/2020    LDLCALC 121 06/04/2020    LABVLDL 9 06/04/2020     TSH:   Lab Results   Component Value Date    TSHREFLEX 0.85 11/15/2019     PSA:   Lab Results   Component Value Date    PSA 2.79 09/24/2020        ASSESSMENT/PLAN:  Assessment/Plan:  Lali Huff was seen today for 6 month follow-up and weight loss. Diagnoses and all orders for this visit:    Type 2 diabetes mellitus without complication, without long-term current use of insulin (HCC)  Excellent hemoglobin A1c. Moreover patient lost weight and follow diet very strictly with significant decrease calorie intake  We will discontinue his Metformin.  -     HEMOGLOBIN A1C; Future  -     Urinalysis; Future    Weight loss and insomnia and mild cognitive impairment. -     mirtazapine (REMERON) 7.5 MG tablet; Take 1 tablet by mouth nightly  -     Nutritional Supplements (ENSURE ACTIVE HIGH PROTEIN) LIQD; Take 1 Can by mouth 2 times daily    Essential hypertension  -     CBC; Future  -     BASIC METABOLIC PANEL; Future  -     Urinalysis; Future  The current medical regimen is effective;  continue present plan and medications. Late onset Alzheimer's disease without behavioral disturbance ([facility] [address]  -     CT HEAD WO CONTRAST; Future  Neurologist evaluation reviewed.   Patient is reluctant to start any medication          Orders Placed This Encounter   Procedures    CT HEAD WO CONTRAST     Standing Status:   Future     Standing Expiration Date:   1/7/2022    CBC     Standing Status:   Future     Number of Occurrences:   1     Standing Expiration Date:   1/7/2022    BASIC METABOLIC PANEL     Standing Status:   Future     Number of Occurrences:   1     Standing Expiration Date:   1/7/2022    HEMOGLOBIN A1C     Standing Status:   Future     Number of Occurrences:   1     Standing Expiration Date:   1/7/2022    Urinalysis     Standing Status:   Future     Number of Occurrences:   1     Standing Expiration Date:   1/7/2022     Current Outpatient Medications   Medication Sig Dispense Refill    mirtazapine (REMERON) 7.5 MG tablet Take 1 tablet by mouth nightly 90 tablet 1    Nutritional Supplements (ENSURE ACTIVE HIGH PROTEIN) LIQD Take 1 Can by mouth 2 times daily 60 Can 2    losartan-hydroCHLOROthiazide (HYZAAR) 50-12.5 MG per tablet TAKE 1/2 TABLET BY MOUTH ONE TIME A DAY 45 tablet 1    atorvastatin (LIPITOR) 20 MG tablet TAKE 1 TABLET BY MOUTH ONE TIME A DAY  90 tablet 1    senna-docusate (PERICOLACE) 8.6-50 MG per tablet Take 1 tablet by mouth daily 30 tablet 0    Wheat Dextrin (BENEFIBER) POWD Take 4 g by mouth 3 times daily (with meals) 500 g 2    magnesium oxide (MAG-OX) 400 MG tablet Take 400 mg by mouth daily      Multiple Vitamins-Minerals (THERAPEUTIC MULTIVITAMIN-MINERALS) tablet Take 1 tablet by mouth daily      VITAMIN A PO Vitamin A Oral        Active      blood glucose monitor strips Test 1 times a day & as needed for symptoms of irregular blood glucose. Dispense sufficient amount for indicated testing frequency plus additional to accommodate PRN testing needs. 100 strip 0    pantoprazole (PROTONIX) 40 MG tablet Take 1 tablet by mouth every morning (before breakfast) (Patient not taking: Reported on 11/2/2020) 30 tablet 0    glucose monitoring kit (FREESTYLE) monitoring kit 1 kit by Does not apply route daily 1 kit 0    Selenium 200 MCG TABS Take by mouth nightly       No current facility-administered medications for this visit. Return in about 3 months (around 4/7/2021). An After Visit Summary was printed and given to the patient. Documentation was done using voice recognition dragon software. Every effort was made to ensure accuracy; however, inadvertent  Unintentional computerized transcription errors may be present.

## 2021-01-08 LAB
ESTIMATED AVERAGE GLUCOSE: 108.3 MG/DL
HBA1C MFR BLD: 5.4 %

## 2021-01-11 DIAGNOSIS — R31.9 HEMATURIA, UNSPECIFIED TYPE: Primary | ICD-10-CM

## 2021-03-10 ENCOUNTER — TELEPHONE (OUTPATIENT)
Dept: PULMONOLOGY | Age: 81
End: 2021-03-10

## 2021-03-10 DIAGNOSIS — I26.99 BILATERAL PULMONARY EMBOLISM (HCC): ICD-10-CM

## 2021-03-10 DIAGNOSIS — M79.89 LEG SWELLING: Primary | ICD-10-CM

## 2021-03-10 NOTE — TELEPHONE ENCOUNTER
ARIANNAI-Patient called to say he has swelling in his feet and ankles. Dr. Debbie Estrada had taken him off his Xarelto a while back and he is concerned that he might have a DVT. He said he wears compression socks, but does not take Lasix. After discussing that it could be a vascular issue or something with his heart, the patient said he would call his PCP first and see what he recommends. I told him we would only call him back if we wanted to see him in office.   [phone]

## 2021-03-12 ENCOUNTER — OFFICE VISIT (OUTPATIENT)
Dept: INTERNAL MEDICINE CLINIC | Age: 81
End: 2021-03-12
Payer: MEDICARE

## 2021-03-12 VITALS
DIASTOLIC BLOOD PRESSURE: 70 MMHG | SYSTOLIC BLOOD PRESSURE: 120 MMHG | BODY MASS INDEX: 22.75 KG/M2 | WEIGHT: 168 LBS | HEIGHT: 72 IN | HEART RATE: 80 BPM | TEMPERATURE: 97.6 F

## 2021-03-12 DIAGNOSIS — R60.0 BILATERAL LEG EDEMA: ICD-10-CM

## 2021-03-12 DIAGNOSIS — Z86.19 HISTORY OF HELICOBACTER PYLORI INFECTION: ICD-10-CM

## 2021-03-12 DIAGNOSIS — R60.0 BILATERAL LEG EDEMA: Primary | ICD-10-CM

## 2021-03-12 DIAGNOSIS — I26.99 BILATERAL PULMONARY EMBOLISM (HCC): ICD-10-CM

## 2021-03-12 DIAGNOSIS — K21.9 GASTROESOPHAGEAL REFLUX DISEASE WITHOUT ESOPHAGITIS: ICD-10-CM

## 2021-03-12 LAB
ANION GAP SERPL CALCULATED.3IONS-SCNC: 5 MMOL/L (ref 3–16)
BUN BLDV-MCNC: 13 MG/DL (ref 7–20)
CALCIUM SERPL-MCNC: 8.9 MG/DL (ref 8.3–10.6)
CHLORIDE BLD-SCNC: 99 MMOL/L (ref 99–110)
CO2: 36 MMOL/L (ref 21–32)
CREAT SERPL-MCNC: 0.9 MG/DL (ref 0.8–1.3)
GFR AFRICAN AMERICAN: >60
GFR NON-AFRICAN AMERICAN: >60
GLUCOSE BLD-MCNC: 84 MG/DL (ref 70–99)
POTASSIUM SERPL-SCNC: 3.8 MMOL/L (ref 3.5–5.1)
SODIUM BLD-SCNC: 140 MMOL/L (ref 136–145)

## 2021-03-12 PROCEDURE — G8420 CALC BMI NORM PARAMETERS: HCPCS | Performed by: INTERNAL MEDICINE

## 2021-03-12 PROCEDURE — G8484 FLU IMMUNIZE NO ADMIN: HCPCS | Performed by: INTERNAL MEDICINE

## 2021-03-12 PROCEDURE — 4040F PNEUMOC VAC/ADMIN/RCVD: CPT | Performed by: INTERNAL MEDICINE

## 2021-03-12 PROCEDURE — 1036F TOBACCO NON-USER: CPT | Performed by: INTERNAL MEDICINE

## 2021-03-12 PROCEDURE — 99213 OFFICE O/P EST LOW 20 MIN: CPT | Performed by: INTERNAL MEDICINE

## 2021-03-12 PROCEDURE — 1123F ACP DISCUSS/DSCN MKR DOCD: CPT | Performed by: INTERNAL MEDICINE

## 2021-03-12 PROCEDURE — G8427 DOCREV CUR MEDS BY ELIG CLIN: HCPCS | Performed by: INTERNAL MEDICINE

## 2021-03-12 RX ORDER — POTASSIUM CHLORIDE 750 MG/1
10 TABLET, EXTENDED RELEASE ORAL DAILY
Qty: 30 TABLET | Refills: 2 | Status: SHIPPED | OUTPATIENT
Start: 2021-03-12 | End: 2021-06-04 | Stop reason: SDUPTHER

## 2021-03-12 RX ORDER — HYDROCHLOROTHIAZIDE 12.5 MG/1
12.5 CAPSULE, GELATIN COATED ORAL DAILY
Qty: 30 CAPSULE | Refills: 2 | Status: SHIPPED | OUTPATIENT
Start: 2021-03-12 | End: 2021-06-04 | Stop reason: SDUPTHER

## 2021-03-12 RX ORDER — PANTOPRAZOLE SODIUM 40 MG/1
40 TABLET, DELAYED RELEASE ORAL
Qty: 30 TABLET | Refills: 1 | Status: SHIPPED | OUTPATIENT
Start: 2021-03-12 | End: 2021-07-13

## 2021-03-12 ASSESSMENT — ENCOUNTER SYMPTOMS
VOICE CHANGE: 0
TROUBLE SWALLOWING: 0
SHORTNESS OF BREATH: 0
WHEEZING: 0

## 2021-03-12 NOTE — PROGRESS NOTES
[address] [facility]  1940  male  79 y.o. SUBJECTIVE:       Chief Complaint   Patient presents with    Foot Swelling     ankle --bilateral, worse on lt ankle--not taking the lisinopril/hctz    Weight Gain     eating more calories , up 15#       HPI:  Follow-up visit. Patient has been complaining of gradual onset of bilateral lower leg swelling over the last couple of months. Patient denies any exertional chest pain, dyspnea, palpitations, syncope, orthopnea,  or paroxysmal nocturnal dyspnea. The patient denies cough, chest pain, dyspnea, wheezing or hemoptysis. He denies any calf pain. History of pulmonary embolism. He discontinued Xarelto. He was not restart Xarelto. He is no longer taking his lisinopril hydrochlorothiazide. He still get occasional dyspeptic symptoms. Since His last visit he gained about 13 pounds.       Past Medical History:   Diagnosis Date    DVT (deep venous thrombosis) (HCC)     Dysuria     Hypertension     Prostate enlargement      Past Surgical History:   Procedure Laterality Date    PROSTATE SURGERY       Social History     Socioeconomic History    Marital status:      Spouse name: None    Number of children: 3    Years of education: None    Highest education level: None   Occupational History    Occupation: retired    Social Needs    Financial resource strain: None    Food insecurity     Worry: None     Inability: None    Transportation needs     Medical: None     Non-medical: None   Tobacco Use    Smoking status: Former Smoker     Packs/day: 2.00     Years: 12.00     Pack years: 24.00     Types: Cigarettes     Quit date: 1972     Years since quittin.1    Smokeless tobacco: Never Used    Tobacco comment: started to smoke at 26 / smoked up to 2 ppd    Substance and Sexual Activity    Alcohol use: No    Drug use: No    Sexual activity: Yes   Lifestyle    Physical activity     Days per week: None     Minutes per session: None    Stress: None   Relationships    Social connections     Talks on phone: None     Gets together: None     Attends [employer] service: None     Active member of club or organization: None     Attends meetings of clubs or organizations: None     Relationship status: None    Intimate partner violence     Fear of current or ex partner: None     Emotionally abused: None     Physically abused: None     Forced sexual activity: None   Other Topics Concern    None   Social History Narrative    None     Family History   Problem Relation Age of Onset    Asthma Sister     Other Sister        Review of Systems   Constitutional: Negative for diaphoresis and unexpected weight change. HENT: Negative for trouble swallowing and voice change. Respiratory: Negative for shortness of breath and wheezing. Cardiovascular: Positive for leg swelling. Negative for chest pain and palpitations. Neurological: Negative for dizziness and light-headedness. OBJECTIVE:  Pulse Readings from Last 4 Encounters:   03/12/21 80   01/07/21 72   12/29/20 74   11/02/20 75     Wt Readings from Last 4 Encounters:   03/12/21 168 lb (76.2 kg)   01/07/21 153 lb (69.4 kg)   12/29/20 157 lb 3.2 oz (71.3 kg)   11/02/20 163 lb (73.9 kg)     BP Readings from Last 4 Encounters:   03/12/21 120/70   01/07/21 138/88   12/29/20 120/70   11/02/20 131/78     Physical Exam  Constitutional:       Appearance: Normal appearance. He is not ill-appearing or diaphoretic. Eyes:      Conjunctiva/sclera: Conjunctivae normal.   Cardiovascular:      Rate and Rhythm: Normal rate. Pulses: Normal pulses. Heart sounds: Normal heart sounds. Pulmonary:      Effort: Pulmonary effort is normal.      Breath sounds: Normal breath sounds. Abdominal:      General: Bowel sounds are normal.      Tenderness: There is no abdominal tenderness. Musculoskeletal:         General: No tenderness. Right lower leg: Edema present. Left lower leg: Edema present. Comments: Trace to 1+ bilateral pedal edema. Patient have no calf tenderness. No varicose veins. Neurological:      General: No focal deficit present. Mental Status: He is alert and oriented to person, place, and time. CBC:   Lab Results   Component Value Date    WBC 2.5 01/07/2021    HGB 12.9 01/07/2021    HCT 39.1 01/07/2021     01/07/2021     CMP:  Lab Results   Component Value Date     01/07/2021    K 3.6 01/07/2021    CL 94 01/07/2021    CO2 34 01/07/2021    ANIONGAP 8 01/07/2021    GLUCOSE 131 01/07/2021    GLUCOSE 113 09/11/2020    BUN 22 01/07/2021    CREATININE 1.0 01/07/2021    GFRAA >60 01/07/2021    CALCIUM 9.6 01/07/2021    PROT 6.7 10/07/2020    LABALBU 4.1 10/07/2020    AGRATIO 1.6 09/24/2020    BILITOT 1.0 10/07/2020    ALKPHOS 26 10/07/2020    ALT 15 10/07/2020    AST 24 10/07/2020    GLOB 2.5 09/24/2020     URINALYSIS:  Lab Results   Component Value Date    GLUCOSEU Negative 01/07/2021    KETUA Negative 01/07/2021    SPECGRAV 1.016 01/07/2021    BLOODU Negative 01/07/2021    PHUR 7.0 01/07/2021    PROTEINU Negative 01/07/2021    NITRU Negative 01/07/2021    LEUKOCYTESUR Negative 01/07/2021    LABMICR YES 01/07/2021    URINETYPE [place]atch 01/07/2021     HBA1C:   Lab Results   Component Value Date    LABA1C 5.4 01/07/2021    .3 01/07/2021     MICRO/ALB:   Lab Results   Component Value Date    LABMICR YES 01/07/2021     LIPID:  Lab Results   Component Value Date    CHOL 192 11/15/2019    TRIG 61 11/15/2019    HDL 62 06/04/2020    LDLCALC 121 06/04/2020    LABVLDL 9 06/04/2020     TSH:   Lab Results   Component Value Date    TSHREFLEX 0.85 11/15/2019     PSA:   Lab Results   Component Value Date    PSA 2.79 09/24/2020        ASSESSMENT/PLAN:  Assessment/Plan:  Sarah Torres was seen today for foot swelling and weight gain. Diagnoses and all orders for this visit:    Bilateral leg edema  -     BASIC METABOLIC PANEL; Future  -     CBC;  Future  Low-dose hydrochlorothiazide with potassium supplement. Leg elevation. Bilateral pulmonary embolism (HCC)  History of bilateral pulmonary embolism in the past.  Patient would not take long-term blood thinner. Gastroesophageal reflux disease and history of H. pylori infection. Periodic reflux symptoms. He is advised to take as needed  -     pantoprazole (PROTONIX) 40 MG tablet; Take 1 tablet by mouth every morning (before breakfast)            Orders Placed This Encounter   Procedures    BASIC METABOLIC PANEL     Standing Status:   Future     Number of Occurrences:   1     Standing Expiration Date:   6/12/2021    CBC     Standing Status:   Future     Standing Expiration Date:   3/12/2022     Current Outpatient Medications   Medication Sig Dispense Refill    hydroCHLOROthiazide (MICROZIDE) 12.5 MG capsule Take 1 capsule by mouth daily 30 capsule 2    pantoprazole (PROTONIX) 40 MG tablet Take 1 tablet by mouth every morning (before breakfast) 30 tablet 1    potassium chloride (KLOR-CON M) 10 MEQ extended release tablet Take 1 tablet by mouth daily 30 tablet 2    Multiple Vitamins-Minerals (THERAPEUTIC MULTIVITAMIN-MINERALS) tablet Take 1 tablet by mouth daily      mirtazapine (REMERON) 7.5 MG tablet Take 1 tablet by mouth nightly (Patient not taking: Reported on 3/12/2021) 90 tablet 1    atorvastatin (LIPITOR) 20 MG tablet TAKE 1 TABLET BY MOUTH ONE TIME A DAY  (Patient not taking: Reported on 3/12/2021) 90 tablet 1    senna-docusate (PERICOLACE) 8.6-50 MG per tablet Take 1 tablet by mouth daily (Patient not taking: Reported on 3/12/2021) 30 tablet 0    Wheat Dextrin (BENEFIBER) POWD Take 4 g by mouth 3 times daily (with meals) (Patient not taking: Reported on 3/12/2021) 500 g 2    blood glucose monitor strips Test 1 times a day & as needed for symptoms of irregular blood glucose. Dispense sufficient amount for indicated testing frequency plus additional to accommodate PRN testing needs.  100 strip 0    glucose monitoring kit (FREESTYLE) monitoring kit 1 kit by Does not apply route daily 1 kit 0    Selenium 200 MCG TABS Take by mouth nightly       No current facility-administered medications for this visit. Return in about 3 months (around 6/12/2021). Or sooner if any worsening new or concerning symptoms. Patient will get lab work 4 weeks from now  An After Visit Summary was printed and given to the patient. Documentation was done using voice recognition dragon software. Every effort was made to ensure accuracy; however, inadvertent  Unintentional computerized transcription errors may be present.

## 2021-04-13 ENCOUNTER — OFFICE VISIT (OUTPATIENT)
Dept: INTERNAL MEDICINE CLINIC | Age: 81
End: 2021-04-13
Payer: MEDICARE

## 2021-04-13 VITALS
WEIGHT: 172 LBS | BODY MASS INDEX: 23.3 KG/M2 | HEART RATE: 72 BPM | DIASTOLIC BLOOD PRESSURE: 76 MMHG | HEIGHT: 72 IN | SYSTOLIC BLOOD PRESSURE: 124 MMHG

## 2021-04-13 DIAGNOSIS — I10 ESSENTIAL HYPERTENSION: ICD-10-CM

## 2021-04-13 DIAGNOSIS — R60.0 BILATERAL LEG EDEMA: ICD-10-CM

## 2021-04-13 DIAGNOSIS — I10 ESSENTIAL HYPERTENSION: Primary | ICD-10-CM

## 2021-04-13 LAB
HCT VFR BLD CALC: 33.9 % (ref 40.5–52.5)
HEMOGLOBIN: 11.5 G/DL (ref 13.5–17.5)
MCH RBC QN AUTO: 31.8 PG (ref 26–34)
MCHC RBC AUTO-ENTMCNC: 34 G/DL (ref 31–36)
MCV RBC AUTO: 93.5 FL (ref 80–100)
PDW BLD-RTO: 14 % (ref 12.4–15.4)
PLATELET # BLD: 191 K/UL (ref 135–450)
PMV BLD AUTO: 7.4 FL (ref 5–10.5)
RBC # BLD: 3.62 M/UL (ref 4.2–5.9)
WBC # BLD: 2.8 K/UL (ref 4–11)

## 2021-04-13 PROCEDURE — 1123F ACP DISCUSS/DSCN MKR DOCD: CPT | Performed by: INTERNAL MEDICINE

## 2021-04-13 PROCEDURE — 1036F TOBACCO NON-USER: CPT | Performed by: INTERNAL MEDICINE

## 2021-04-13 PROCEDURE — 3288F FALL RISK ASSESSMENT DOCD: CPT | Performed by: INTERNAL MEDICINE

## 2021-04-13 PROCEDURE — G8427 DOCREV CUR MEDS BY ELIG CLIN: HCPCS | Performed by: INTERNAL MEDICINE

## 2021-04-13 PROCEDURE — 99213 OFFICE O/P EST LOW 20 MIN: CPT | Performed by: INTERNAL MEDICINE

## 2021-04-13 PROCEDURE — 4040F PNEUMOC VAC/ADMIN/RCVD: CPT | Performed by: INTERNAL MEDICINE

## 2021-04-13 PROCEDURE — G8420 CALC BMI NORM PARAMETERS: HCPCS | Performed by: INTERNAL MEDICINE

## 2021-04-13 ASSESSMENT — ENCOUNTER SYMPTOMS
NAUSEA: 0
ABDOMINAL PAIN: 0
TROUBLE SWALLOWING: 0
SHORTNESS OF BREATH: 0
VOICE CHANGE: 0
VOMITING: 0
WHEEZING: 0

## 2021-04-13 ASSESSMENT — PATIENT HEALTH QUESTIONNAIRE - PHQ9
2. FEELING DOWN, DEPRESSED OR HOPELESS: 0
SUM OF ALL RESPONSES TO PHQ QUESTIONS 1-9: 0
SUM OF ALL RESPONSES TO PHQ QUESTIONS 1-9: 0
SUM OF ALL RESPONSES TO PHQ9 QUESTIONS 1 & 2: 0

## 2021-04-13 NOTE — PROGRESS NOTES
[address] [facility]  1940  male  79 y.o. SUBJECTIVE:       Chief Complaint   Patient presents with    1 Month Follow-Up    Weight Gain     4#       HPI:  Follow-up visit for hypertension and bilateral leg swelling. Since last visit  Since last visit his leg swelling improved. Patient denies chest pain palpitation dizziness.         Past Medical History:   Diagnosis Date    DVT (deep venous thrombosis) (HCC)     Dysuria     Hypertension     Prostate enlargement      Past Surgical History:   Procedure Laterality Date    PROSTATE SURGERY       Social History     Socioeconomic History    Marital status:      Spouse name: None    Number of children: 3    Years of education: None    Highest education level: None   Occupational History    Occupation: retired    Social Needs    Financial resource strain: None    Food insecurity     Worry: None     Inability: None    Transportation needs     Medical: None     Non-medical: None   Tobacco Use    Smoking status: Former Smoker     Packs/day: 2.00     Years: 12.00     Pack years: 24.00     Types: Cigarettes     Quit date: 1972     Years since quittin.2    Smokeless tobacco: Never Used    Tobacco comment: started to smoke at 26 / smoked up to 2 ppd    Substance and Sexual Activity    Alcohol use: No    Drug use: No    Sexual activity: Yes   Lifestyle    Physical activity     Days per week: None     Minutes per session: None    Stress: None   Relationships    Social connections     Talks on phone: None     Gets together: None     Attends Denominational service: None     Active member of club or organization: None     Attends meetings of clubs or organizations: None     Relationship status: None    Intimate partner violence     Fear of current or ex partner: None     Emotionally abused: None     Physically abused: None     Forced sexual activity: None   Other Topics Concern    None   Social History Narrative    None     Family History Problem Relation Age of Onset    Asthma Sister     Other Sister        Review of Systems   Constitutional: Negative for diaphoresis and unexpected weight change. HENT: Negative for trouble swallowing and voice change. Respiratory: Negative for shortness of breath and wheezing. Cardiovascular: Negative for chest pain and palpitations. Gastrointestinal: Negative for abdominal pain, nausea and vomiting. Neurological: Negative for dizziness and light-headedness. OBJECTIVE:  Pulse Readings from Last 4 Encounters:   04/13/21 72   03/12/21 80   01/07/21 72   12/29/20 74     Wt Readings from Last 4 Encounters:   04/13/21 172 lb (78 kg)   03/12/21 168 lb (76.2 kg)   01/07/21 153 lb (69.4 kg)   12/29/20 157 lb 3.2 oz (71.3 kg)     BP Readings from Last 4 Encounters:   04/13/21 124/76   03/12/21 120/70   01/07/21 138/88   12/29/20 120/70     Physical Exam  Vitals signs and nursing note reviewed. Constitutional:       Appearance: He is not ill-appearing. Eyes:      Conjunctiva/sclera: Conjunctivae normal.   Cardiovascular:      Rate and Rhythm: Normal rate and regular rhythm. Pulses: Normal pulses. Heart sounds: Normal heart sounds. Pulmonary:      Effort: Pulmonary effort is normal.      Breath sounds: Normal breath sounds. Abdominal:      General: Bowel sounds are normal.   Musculoskeletal:      Right lower leg: No edema. Left lower leg: No edema. Neurological:      Mental Status: He is alert.          CBC:   Lab Results   Component Value Date    WBC 2.5 01/07/2021    HGB 12.9 01/07/2021    HCT 39.1 01/07/2021     01/07/2021     CMP:  Lab Results   Component Value Date     03/12/2021    K 3.8 03/12/2021    CL 99 03/12/2021    CO2 36 03/12/2021    ANIONGAP 5 03/12/2021    GLUCOSE 84 03/12/2021    GLUCOSE 113 09/11/2020    BUN 13 03/12/2021    CREATININE 0.9 03/12/2021    GFRAA >60 03/12/2021    CALCIUM 8.9 03/12/2021    PROT 6.7 10/07/2020    LABALBU 4.1 10/07/2020 AGRATIO 1.6 09/24/2020    BILITOT 1.0 10/07/2020    ALKPHOS 26 10/07/2020    ALT 15 10/07/2020    AST 24 10/07/2020    GLOB 2.5 09/24/2020     URINALYSIS:  Lab Results   Component Value Date    GLUCOSEU Negative 01/07/2021    KETUA Negative 01/07/2021    SPECGRAV 1.016 01/07/2021    BLOODU Negative 01/07/2021    PHUR 7.0 01/07/2021    PROTEINU Negative 01/07/2021    NITRU Negative 01/07/2021    LEUKOCYTESUR Negative 01/07/2021    LABMICR YES 01/07/2021    URINETYPE Cleancatch 01/07/2021     HBA1C:   Lab Results   Component Value Date    LABA1C 5.4 01/07/2021    .3 01/07/2021     MICRO/ALB:   Lab Results   Component Value Date    LABMICR YES 01/07/2021     LIPID:  Lab Results   Component Value Date    CHOL 192 11/15/2019    TRIG 61 11/15/2019    HDL 62 06/04/2020    LDLCALC 121 06/04/2020    LABVLDL 9 06/04/2020     TSH:   Lab Results   Component Value Date    TSHREFLEX 0.85 11/15/2019     PSA:   Lab Results   Component Value Date    PSA 2.79 09/24/2020        ASSESSMENT/PLAN:    Blanca Garza was seen today for 1 month follow-up and weight gain. Diagnoses and all orders for this visit:    Essential hypertension  -     BASIC METABOLIC PANEL; Future    Bilateral leg edema  -     BASIC METABOLIC PANEL;  Future  Continue hydrochlorothiazide to low-dose potassium supplement          Orders Placed This Encounter   Procedures    BASIC METABOLIC PANEL     Standing Status:   Future     Number of Occurrences:   1     Standing Expiration Date:   4/13/2022     Current Outpatient Medications   Medication Sig Dispense Refill    hydroCHLOROthiazide (MICROZIDE) 12.5 MG capsule Take 1 capsule by mouth daily 30 capsule 2    pantoprazole (PROTONIX) 40 MG tablet Take 1 tablet by mouth every morning (before breakfast) 30 tablet 1    potassium chloride (KLOR-CON M) 10 MEQ extended release tablet Take 1 tablet by mouth daily 30 tablet 2    atorvastatin (LIPITOR) 20 MG tablet TAKE 1 TABLET BY MOUTH ONE TIME A DAY  90 tablet 1   

## 2021-04-14 LAB
ANION GAP SERPL CALCULATED.3IONS-SCNC: 11 MMOL/L (ref 3–16)
BUN BLDV-MCNC: 17 MG/DL (ref 7–20)
CALCIUM SERPL-MCNC: 9.5 MG/DL (ref 8.3–10.6)
CHLORIDE BLD-SCNC: 99 MMOL/L (ref 99–110)
CO2: 31 MMOL/L (ref 21–32)
CREAT SERPL-MCNC: 1 MG/DL (ref 0.8–1.3)
GFR AFRICAN AMERICAN: >60
GFR NON-AFRICAN AMERICAN: >60
GLUCOSE BLD-MCNC: 113 MG/DL (ref 70–99)
POTASSIUM SERPL-SCNC: 4 MMOL/L (ref 3.5–5.1)
SODIUM BLD-SCNC: 141 MMOL/L (ref 136–145)

## 2021-06-04 ENCOUNTER — TELEPHONE (OUTPATIENT)
Dept: INTERNAL MEDICINE CLINIC | Age: 81
End: 2021-06-04

## 2021-06-04 DIAGNOSIS — R60.0 BILATERAL LEG EDEMA: ICD-10-CM

## 2021-06-04 DIAGNOSIS — I26.99 BILATERAL PULMONARY EMBOLISM (HCC): ICD-10-CM

## 2021-06-04 RX ORDER — POTASSIUM CHLORIDE 750 MG/1
10 TABLET, EXTENDED RELEASE ORAL DAILY
Qty: 90 TABLET | Refills: 1 | Status: SHIPPED | OUTPATIENT
Start: 2021-06-04 | End: 2021-10-04

## 2021-06-04 RX ORDER — HYDROCHLOROTHIAZIDE 12.5 MG/1
12.5 CAPSULE, GELATIN COATED ORAL DAILY
Qty: 90 CAPSULE | Refills: 1 | Status: SHIPPED | OUTPATIENT
Start: 2021-06-04 | End: 2021-12-03

## 2021-06-04 NOTE — TELEPHONE ENCOUNTER
Patient calling requesting refill of potassium chloride (KLOR-CON M) 10 MEQ extended release tablet & hydroCHLOROthiazide (MICROZIDE) 12.5 MG capsule    Last written 03/12/21  Last OV 04/13/21  Next OV 07/13/21  Last recommended OV 07/13/21     Please send to [facility] on [address]. Patient states he is going out of town 6/9/21 and would like prescriptions sent over as soon as possible.

## 2021-07-13 ENCOUNTER — OFFICE VISIT (OUTPATIENT)
Dept: INTERNAL MEDICINE CLINIC | Age: 81
End: 2021-07-13
Payer: MEDICARE

## 2021-07-13 VITALS
OXYGEN SATURATION: 97 % | HEIGHT: 72 IN | SYSTOLIC BLOOD PRESSURE: 122 MMHG | BODY MASS INDEX: 24.38 KG/M2 | WEIGHT: 180 LBS | DIASTOLIC BLOOD PRESSURE: 70 MMHG | TEMPERATURE: 97.8 F | HEART RATE: 68 BPM

## 2021-07-13 DIAGNOSIS — E11.9 TYPE 2 DIABETES MELLITUS WITHOUT COMPLICATION, WITHOUT LONG-TERM CURRENT USE OF INSULIN (HCC): ICD-10-CM

## 2021-07-13 DIAGNOSIS — I10 ESSENTIAL HYPERTENSION: Primary | ICD-10-CM

## 2021-07-13 DIAGNOSIS — I10 ESSENTIAL HYPERTENSION: ICD-10-CM

## 2021-07-13 DIAGNOSIS — K21.9 GASTROESOPHAGEAL REFLUX DISEASE WITHOUT ESOPHAGITIS: ICD-10-CM

## 2021-07-13 LAB
HCT VFR BLD CALC: 37.4 % (ref 40.5–52.5)
HEMOGLOBIN: 12.7 G/DL (ref 13.5–17.5)
MCH RBC QN AUTO: 30.7 PG (ref 26–34)
MCHC RBC AUTO-ENTMCNC: 33.9 G/DL (ref 31–36)
MCV RBC AUTO: 90.7 FL (ref 80–100)
PDW BLD-RTO: 13.1 % (ref 12.4–15.4)
PLATELET # BLD: 149 K/UL (ref 135–450)
PMV BLD AUTO: 8.4 FL (ref 5–10.5)
RBC # BLD: 4.12 M/UL (ref 4.2–5.9)
WBC # BLD: 2.8 K/UL (ref 4–11)

## 2021-07-13 PROCEDURE — G8420 CALC BMI NORM PARAMETERS: HCPCS | Performed by: INTERNAL MEDICINE

## 2021-07-13 PROCEDURE — G8427 DOCREV CUR MEDS BY ELIG CLIN: HCPCS | Performed by: INTERNAL MEDICINE

## 2021-07-13 PROCEDURE — 4040F PNEUMOC VAC/ADMIN/RCVD: CPT | Performed by: INTERNAL MEDICINE

## 2021-07-13 PROCEDURE — 99214 OFFICE O/P EST MOD 30 MIN: CPT | Performed by: INTERNAL MEDICINE

## 2021-07-13 PROCEDURE — 1036F TOBACCO NON-USER: CPT | Performed by: INTERNAL MEDICINE

## 2021-07-13 PROCEDURE — 1123F ACP DISCUSS/DSCN MKR DOCD: CPT | Performed by: INTERNAL MEDICINE

## 2021-07-13 SDOH — ECONOMIC STABILITY: FOOD INSECURITY: WITHIN THE PAST 12 MONTHS, YOU WORRIED THAT YOUR FOOD WOULD RUN OUT BEFORE YOU GOT MONEY TO BUY MORE.: NEVER TRUE

## 2021-07-13 SDOH — ECONOMIC STABILITY: FOOD INSECURITY: WITHIN THE PAST 12 MONTHS, THE FOOD YOU BOUGHT JUST DIDN'T LAST AND YOU DIDN'T HAVE MONEY TO GET MORE.: NEVER TRUE

## 2021-07-13 ASSESSMENT — ENCOUNTER SYMPTOMS
TROUBLE SWALLOWING: 0
ABDOMINAL PAIN: 0
NAUSEA: 0
VOMITING: 0
WHEEZING: 0
VOICE CHANGE: 0
SHORTNESS OF BREATH: 0

## 2021-07-13 ASSESSMENT — SOCIAL DETERMINANTS OF HEALTH (SDOH): HOW HARD IS IT FOR YOU TO PAY FOR THE VERY BASICS LIKE FOOD, HOUSING, MEDICAL CARE, AND HEATING?: NOT HARD AT ALL

## 2021-07-13 NOTE — PROGRESS NOTES
of Food in the Last Year: Never true    [address] in the Last Year: Never true   Transportation Needs:     Lack of Transportation (Medical):  Lack of Transportation (Non-Medical):    Physical Activity:     Days of Exercise per Week:     Minutes of Exercise per Session:    Stress:     Feeling of Stress :    Social Connections:     Frequency of Communication with Friends and Family:     Frequency of Social Gatherings with Friends and Family:     Attends Catholic Services:     Active Member of Clubs or Organizations:     Attends Club or Organization Meetings:     Marital Status:    Intimate Partner Violence:     Fear of Current or Ex-Partner:     Emotionally Abused:     Physically Abused:     Sexually Abused:      Family History   Problem Relation Age of Onset    Asthma Sister     Other Sister        Review of Systems   Constitutional: Negative for diaphoresis and unexpected weight change. HENT: Negative for trouble swallowing and voice change. Respiratory: Negative for shortness of breath and wheezing. Cardiovascular: Negative for chest pain and palpitations. Gastrointestinal: Negative for abdominal pain, nausea and vomiting. Genitourinary: Negative for difficulty urinating and frequency. Neurological: Negative for dizziness and light-headedness. OBJECTIVE:  Pulse Readings from Last 4 Encounters:   07/13/21 68   04/13/21 72   03/12/21 80   01/07/21 72     Wt Readings from Last 4 Encounters:   07/13/21 180 lb (81.6 kg)   04/13/21 172 lb (78 kg)   03/12/21 168 lb (76.2 kg)   01/07/21 153 lb (69.4 kg)     BP Readings from Last 4 Encounters:   07/13/21 122/70   04/13/21 124/76   03/12/21 120/70   01/07/21 138/88     Physical Exam  Vitals and nursing note reviewed. Constitutional:       Appearance: He is not ill-appearing. Eyes:      General: No scleral icterus.      Conjunctiva/sclera: Conjunctivae normal.   Cardiovascular:      Rate and Rhythm: Normal rate and regular rhythm. Pulses: Normal pulses. Heart sounds: Normal heart sounds. Pulmonary:      Effort: Pulmonary effort is normal.      Breath sounds: Normal breath sounds. Abdominal:      General: Bowel sounds are normal.      Tenderness: There is no abdominal tenderness. Musculoskeletal:      Right lower leg: No edema. Left lower leg: No edema. Neurological:      General: No focal deficit present. Mental Status: He is alert and oriented to person, place, and time.          CBC:   Lab Results   Component Value Date    WBC 2.8 04/13/2021    HGB 11.5 04/13/2021    HCT 33.9 04/13/2021     04/13/2021     CMP:  Lab Results   Component Value Date     04/13/2021    K 4.0 04/13/2021    CL 99 04/13/2021    CO2 31 04/13/2021    ANIONGAP 11 04/13/2021    GLUCOSE 113 04/13/2021    GLUCOSE 113 09/11/2020    BUN 17 04/13/2021    CREATININE 1.0 04/13/2021    GFRAA >60 04/13/2021    CALCIUM 9.5 04/13/2021    PROT 6.7 10/07/2020    LABALBU 4.1 10/07/2020    AGRATIO 1.6 09/24/2020    BILITOT 1.0 10/07/2020    ALKPHOS 26 10/07/2020    ALT 15 10/07/2020    AST 24 10/07/2020    GLOB 2.5 09/24/2020     URINALYSIS:  Lab Results   Component Value Date    GLUCOSEU Negative 01/07/2021    KETUA Negative 01/07/2021    SPECGRAV 1.016 01/07/2021    BLOODU Negative 01/07/2021    PHUR 7.0 01/07/2021    PROTEINU Negative 01/07/2021    NITRU Negative 01/07/2021    LEUKOCYTESUR Negative 01/07/2021    LABMICR YES 01/07/2021    URINETYPE Cle[place]atch 01/07/2021     HBA1C:   Lab Results   Component Value Date    LABA1C 5.4 01/07/2021    .3 01/07/2021     MICRO/ALB:   Lab Results   Component Value Date    LABMICR YES 01/07/2021     LIPID:  Lab Results   Component Value Date    CHOL 192 11/15/2019    TRIG 61 11/15/2019    HDL 62 06/04/2020    LDLCALC 121 06/04/2020    LABVLDL 9 06/04/2020     TSH:   Lab Results   Component Value Date    TSHREFLEX 0.85 11/15/2019     PSA:   Lab Results   Component Value Date    PSA 2.79 09/24/2020        ASSESSMENT/PLAN:  Assessment/Plan:  Brittany Ponce was seen today for 3 month follow-up, hypertension and diabetes. Diagnoses and all orders for this visit:    Essential hypertension  -     CBC; Future  -     BASIC METABOLIC PANEL; Future  Continue hydrochlorothiazide and potassium supplement  Gastroesophageal reflux disease without esophagitis  Patient is completely asymptomatic. He no longer take pantoprazole  Type 2 diabetes mellitus without complication, without long-term current use of insulin (HCC)  -     HEMOGLOBIN A1C; Future  The patient is asked to make an attempt to improve diet and exercise patterns to aid in medical management of this problem. Persistently declining other preventive health intervention. Orders Placed This Encounter   Procedures    HEMOGLOBIN A1C     Standing Status:   Future     Standing Expiration Date:   7/13/2022    CBC     Standing Status:   Future     Standing Expiration Date:   7/13/2022    BASIC METABOLIC PANEL     Standing Status:   Future     Standing Expiration Date:   7/13/2022     Current Outpatient Medications   Medication Sig Dispense Refill    hydroCHLOROthiazide (MICROZIDE) 12.5 MG capsule Take 1 capsule by mouth daily 90 capsule 1    potassium chloride (KLOR-CON M) 10 MEQ extended release tablet Take 1 tablet by mouth daily 90 tablet 1     No current facility-administered medications for this visit. Return in about 3 months (around 10/13/2021). An After Visit Summary was printed and given to the patient. Documentation was done using voice recognition dragon software. Every effort was made to ensure accuracy; however, inadvertent  Unintentional computerized transcription errors may be present.

## 2021-07-14 LAB
ANION GAP SERPL CALCULATED.3IONS-SCNC: 7 MMOL/L (ref 3–16)
BUN BLDV-MCNC: 22 MG/DL (ref 7–20)
CALCIUM SERPL-MCNC: 9.6 MG/DL (ref 8.3–10.6)
CHLORIDE BLD-SCNC: 97 MMOL/L (ref 99–110)
CO2: 33 MMOL/L (ref 21–32)
CREAT SERPL-MCNC: 1.1 MG/DL (ref 0.8–1.3)
ESTIMATED AVERAGE GLUCOSE: 116.9 MG/DL
GFR AFRICAN AMERICAN: >60
GFR NON-AFRICAN AMERICAN: >60
GLUCOSE BLD-MCNC: 79 MG/DL (ref 70–99)
HBA1C MFR BLD: 5.7 %
POTASSIUM SERPL-SCNC: 3.9 MMOL/L (ref 3.5–5.1)
SODIUM BLD-SCNC: 137 MMOL/L (ref 136–145)

## 2021-10-04 DIAGNOSIS — I26.99 BILATERAL PULMONARY EMBOLISM (HCC): ICD-10-CM

## 2021-10-04 DIAGNOSIS — R60.0 BILATERAL LEG EDEMA: ICD-10-CM

## 2021-10-04 RX ORDER — POTASSIUM CHLORIDE 750 MG/1
TABLET, EXTENDED RELEASE ORAL
Qty: 90 TABLET | Refills: 0 | Status: SHIPPED | OUTPATIENT
Start: 2021-10-04 | End: 2021-12-27 | Stop reason: SDUPTHER

## 2021-12-02 DIAGNOSIS — R60.0 BILATERAL LEG EDEMA: ICD-10-CM

## 2021-12-03 RX ORDER — HYDROCHLOROTHIAZIDE 12.5 MG/1
CAPSULE, GELATIN COATED ORAL
Qty: 90 CAPSULE | Refills: 0 | Status: SHIPPED | OUTPATIENT
Start: 2021-12-03 | End: 2021-12-27 | Stop reason: SDUPTHER

## 2021-12-15 NOTE — TELEPHONE ENCOUNTER
Autoimmune testing is negative.  No vitamin deficiency is noted. [address] [place] [address]

## 2021-12-27 ENCOUNTER — TELEPHONE (OUTPATIENT)
Dept: INTERNAL MEDICINE CLINIC | Age: 81
End: 2021-12-27

## 2021-12-27 DIAGNOSIS — R60.0 BILATERAL LEG EDEMA: ICD-10-CM

## 2021-12-27 DIAGNOSIS — I26.99 BILATERAL PULMONARY EMBOLISM (HCC): ICD-10-CM

## 2021-12-27 RX ORDER — HYDROCHLOROTHIAZIDE 12.5 MG/1
12.5 CAPSULE, GELATIN COATED ORAL EVERY MORNING
Qty: 30 CAPSULE | Refills: 0 | Status: SHIPPED | OUTPATIENT
Start: 2021-12-27 | End: 2022-03-01

## 2021-12-27 RX ORDER — POTASSIUM CHLORIDE 750 MG/1
10 TABLET, EXTENDED RELEASE ORAL DAILY
Qty: 30 TABLET | Refills: 0 | Status: SHIPPED | OUTPATIENT
Start: 2021-12-27 | End: 2022-03-01

## 2021-12-27 NOTE — TELEPHONE ENCOUNTER
Pt calling is in [address] and forgot his medication---asking if you could call in 4 pills each of his Hydrochlorothiazide and his potassium ----to [facility]  [phone].   Thanks

## 2022-01-13 ENCOUNTER — OFFICE VISIT (OUTPATIENT)
Dept: INTERNAL MEDICINE CLINIC | Age: 82
End: 2022-01-13
Payer: MEDICARE

## 2022-01-13 VITALS
BODY MASS INDEX: 26.25 KG/M2 | HEART RATE: 67 BPM | DIASTOLIC BLOOD PRESSURE: 80 MMHG | HEIGHT: 72 IN | SYSTOLIC BLOOD PRESSURE: 137 MMHG | OXYGEN SATURATION: 98 % | WEIGHT: 193.8 LBS

## 2022-01-13 DIAGNOSIS — I10 ESSENTIAL HYPERTENSION: ICD-10-CM

## 2022-01-13 DIAGNOSIS — I26.99 BILATERAL PULMONARY EMBOLISM (HCC): ICD-10-CM

## 2022-01-13 DIAGNOSIS — F02.80 LATE ONSET ALZHEIMER'S DISEASE WITHOUT BEHAVIORAL DISTURBANCE (HCC): ICD-10-CM

## 2022-01-13 DIAGNOSIS — E11.9 TYPE 2 DIABETES MELLITUS WITHOUT COMPLICATION, WITHOUT LONG-TERM CURRENT USE OF INSULIN (HCC): ICD-10-CM

## 2022-01-13 DIAGNOSIS — G30.1 LATE ONSET ALZHEIMER'S DISEASE WITHOUT BEHAVIORAL DISTURBANCE (HCC): ICD-10-CM

## 2022-01-13 DIAGNOSIS — I10 ESSENTIAL HYPERTENSION: Primary | ICD-10-CM

## 2022-01-13 LAB
A/G RATIO: 1.2 (ref 1.1–2.2)
ALBUMIN SERPL-MCNC: 3.8 G/DL (ref 3.4–5)
ALP BLD-CCNC: 31 U/L (ref 40–129)
ALT SERPL-CCNC: 11 U/L (ref 10–40)
ANION GAP SERPL CALCULATED.3IONS-SCNC: 9 MMOL/L (ref 3–16)
AST SERPL-CCNC: 23 U/L (ref 15–37)
BILIRUB SERPL-MCNC: 0.7 MG/DL (ref 0–1)
BILIRUBIN URINE: NEGATIVE
BLOOD, URINE: NEGATIVE
BUN BLDV-MCNC: 17 MG/DL (ref 7–20)
CALCIUM SERPL-MCNC: 9.4 MG/DL (ref 8.3–10.6)
CHLORIDE BLD-SCNC: 101 MMOL/L (ref 99–110)
CHOLESTEROL, TOTAL: 199 MG/DL (ref 0–199)
CLARITY: CLEAR
CO2: 32 MMOL/L (ref 21–32)
COLOR: YELLOW
CREAT SERPL-MCNC: 1.2 MG/DL (ref 0.8–1.3)
GFR AFRICAN AMERICAN: >60
GFR NON-AFRICAN AMERICAN: 58
GLUCOSE BLD-MCNC: 112 MG/DL (ref 70–99)
GLUCOSE URINE: NEGATIVE MG/DL
HCT VFR BLD CALC: 39.3 % (ref 40.5–52.5)
HDLC SERPL-MCNC: 68 MG/DL (ref 40–60)
HEMOGLOBIN: 13.3 G/DL (ref 13.5–17.5)
KETONES, URINE: NEGATIVE MG/DL
LDL CHOLESTEROL CALCULATED: 123 MG/DL
LEUKOCYTE ESTERASE, URINE: NEGATIVE
MCH RBC QN AUTO: 29.6 PG (ref 26–34)
MCHC RBC AUTO-ENTMCNC: 33.7 G/DL (ref 31–36)
MCV RBC AUTO: 87.7 FL (ref 80–100)
MICROSCOPIC EXAMINATION: NORMAL
NITRITE, URINE: NEGATIVE
PDW BLD-RTO: 13.5 % (ref 12.4–15.4)
PH UA: 7 (ref 5–8)
PLATELET # BLD: 193 K/UL (ref 135–450)
PMV BLD AUTO: 7.8 FL (ref 5–10.5)
POTASSIUM SERPL-SCNC: 3.9 MMOL/L (ref 3.5–5.1)
PROTEIN UA: NEGATIVE MG/DL
RBC # BLD: 4.48 M/UL (ref 4.2–5.9)
SODIUM BLD-SCNC: 142 MMOL/L (ref 136–145)
SPECIFIC GRAVITY UA: 1.01 (ref 1–1.03)
TOTAL PROTEIN: 7 G/DL (ref 6.4–8.2)
TRIGL SERPL-MCNC: 40 MG/DL (ref 0–150)
TSH REFLEX: 0.92 UIU/ML (ref 0.27–4.2)
URINE TYPE: NORMAL
UROBILINOGEN, URINE: 0.2 E.U./DL
VLDLC SERPL CALC-MCNC: 8 MG/DL
WBC # BLD: 3.3 K/UL (ref 4–11)

## 2022-01-13 PROCEDURE — G8484 FLU IMMUNIZE NO ADMIN: HCPCS | Performed by: INTERNAL MEDICINE

## 2022-01-13 PROCEDURE — G8427 DOCREV CUR MEDS BY ELIG CLIN: HCPCS | Performed by: INTERNAL MEDICINE

## 2022-01-13 PROCEDURE — 1036F TOBACCO NON-USER: CPT | Performed by: INTERNAL MEDICINE

## 2022-01-13 PROCEDURE — G8417 CALC BMI ABV UP PARAM F/U: HCPCS | Performed by: INTERNAL MEDICINE

## 2022-01-13 PROCEDURE — 1123F ACP DISCUSS/DSCN MKR DOCD: CPT | Performed by: INTERNAL MEDICINE

## 2022-01-13 PROCEDURE — 4040F PNEUMOC VAC/ADMIN/RCVD: CPT | Performed by: INTERNAL MEDICINE

## 2022-01-13 PROCEDURE — 99214 OFFICE O/P EST MOD 30 MIN: CPT | Performed by: INTERNAL MEDICINE

## 2022-01-13 ASSESSMENT — ENCOUNTER SYMPTOMS
WHEEZING: 0
VOMITING: 0
NAUSEA: 0
ABDOMINAL PAIN: 0
SHORTNESS OF BREATH: 0

## 2022-01-13 NOTE — PROGRESS NOTES
[address] [facility]  1940  male  [de-identified] y.o. SUBJECTIVE:       Chief Complaint   Patient presents with    Follow-up    Hypertension       HPI:  Follow-up visit for chronic problems. Patient denies having any new or concerning symptoms. Past Medical History:   Diagnosis Date    DVT (deep venous thrombosis) (HCC)     Dysuria     Hypertension     Prostate enlargement      Past Surgical History:   Procedure Laterality Date    PROSTATE SURGERY       Social History     Socioeconomic History    Marital status:      Spouse name: None    Number of children: 3    Years of education: None    Highest education level: None   Occupational History    Occupation: retired    Tobacco Use    Smoking status: Former Smoker     Packs/day: 2.00     Years: 12.00     Pack years: 24.00     Types: Cigarettes     Quit date: 1972     Years since quittin.9    Smokeless tobacco: Never Used    Tobacco comment: started to smoke at 26 / smoked up to 2 ppd    Vaping Use    Vaping Use: Never used   Substance and Sexual Activity    Alcohol use: No    Drug use: No    Sexual activity: Yes   Other Topics Concern    None   Social History Narrative    None     Social Determinants of Health     Financial Resource Strain: Low Risk     Difficulty of Paying Living Expenses: Not hard at all   Food Insecurity: No Food Insecurity    Worried About Running Out of Food in the Last Year: Never true    [place] of Food in the Last Year: Never true   Transportation Needs:     Lack of Transportation (Medical): Not on file    Lack of Transportation (Non-Medical):  Not on file   Physical Activity:     Days of Exercise per Week: Not on file    Minutes of Exercise per Session: Not on file   Stress:     Feeling of Stress : Not on file   Social Connections:     Frequency of Communication with Friends and Family: Not on file    Frequency of Social Gatherings with Friends and Family: Not on file    Attends Jainism Services: Not on file    Active Member of Clubs or Organizations: Not on file    Attends Club or Organization Meetings: Not on file    Marital Status: Not on file   Intimate Partner Violence:     Fear of Current or Ex-Partner: Not on file    Emotionally Abused: Not on file    Physically Abused: Not on file    Sexually Abused: Not on file   Housing Stability:     Unable to Pay for Housing in the Last Year: Not on file    Number of [place] in the Last Year: Not on file    Unstable Housing in the Last Year: Not on file     Family History   Problem Relation Age of Onset    Asthma Sister     Other Sister        Review of Systems   Constitutional: Negative for diaphoresis and unexpected weight change. Eyes: Negative for visual disturbance. Respiratory: Negative for shortness of breath and wheezing. Cardiovascular: Negative for chest pain and palpitations. Gastrointestinal: Negative for abdominal pain, nausea and vomiting. Genitourinary: Negative for difficulty urinating, flank pain and frequency. Neurological: Negative for dizziness and light-headedness. OBJECTIVE:  Pulse Readings from Last 4 Encounters:   01/13/22 67   07/13/21 68   04/13/21 72   03/12/21 80     Wt Readings from Last 4 Encounters:   01/13/22 193 lb 12.8 oz (87.9 kg)   07/13/21 180 lb (81.6 kg)   04/13/21 172 lb (78 kg)   03/12/21 168 lb (76.2 kg)     BP Readings from Last 4 Encounters:   01/13/22 137/80   07/13/21 122/70   04/13/21 124/76   03/12/21 120/70     Physical Exam  Vitals and nursing note reviewed. Constitutional:       Appearance: Normal appearance. He is not ill-appearing. Eyes:      General: No scleral icterus. Conjunctiva/sclera: Conjunctivae normal.   Cardiovascular:      Rate and Rhythm: Normal rate. Pulses: Normal pulses. Heart sounds: Normal heart sounds. Pulmonary:      Effort: Pulmonary effort is normal.      Breath sounds: Normal breath sounds.    Abdominal:      General: Bowel sounds are normal.      Tenderness: There is no abdominal tenderness. Musculoskeletal:      Right lower leg: No edema. Left lower leg: No edema. Neurological:      General: No focal deficit present. Mental Status: He is alert and oriented to person, place, and time. Psychiatric:         Mood and Affect: Mood normal.         CBC:   Lab Results   Component Value Date    WBC 2.8 07/13/2021    HGB 12.7 07/13/2021    HCT 37.4 07/13/2021     07/13/2021     CMP:  Lab Results   Component Value Date     07/13/2021    K 3.9 07/13/2021    CL 97 07/13/2021    CO2 33 07/13/2021    ANIONGAP 7 07/13/2021    GLUCOSE 79 07/13/2021    GLUCOSE 113 09/11/2020    BUN 22 07/13/2021    CREATININE 1.1 07/13/2021    GFRAA >60 07/13/2021    CALCIUM 9.6 07/13/2021    PROT 6.7 10/07/2020    LABALBU 4.1 10/07/2020    AGRATIO 1.6 09/24/2020    BILITOT 1.0 10/07/2020    ALKPHOS 26 10/07/2020    ALT 15 10/07/2020    AST 24 10/07/2020    GLOB 2.5 09/24/2020     URINALYSIS:  Lab Results   Component Value Date    GLUCOSEU Negative 01/07/2021    KETUA Negative 01/07/2021    SPECGRAV 1.016 01/07/2021    BLOODU Negative 01/07/2021    PHUR 7.0 01/07/2021    PROTEINU Negative 01/07/2021    NITRU Negative 01/07/2021    LEUKOCYTESUR Negative 01/07/2021    LABMICR YES 01/07/2021    URINETYPE Cleancatch 01/07/2021     HBA1C:   Lab Results   Component Value Date    LABA1C 5.7 07/13/2021    .9 07/13/2021     MICRO/ALB:   Lab Results   Component Value Date    LABMICR YES 01/07/2021     LIPID:  Lab Results   Component Value Date    CHOL 192 11/15/2019    TRIG 61 11/15/2019    HDL 62 06/04/2020    LDLCALC 121 06/04/2020    LABVLDL 9 06/04/2020     TSH:   Lab Results   Component Value Date    TSHREFLEX 0.85 11/15/2019     PSA:   Lab Results   Component Value Date    PSA 2.79 09/24/2020        ASSESSMENT/PLAN:  Assessment/Plan:  Deni Jimenes was seen today for follow-up and hypertension.     Diagnoses and all orders for this visit:    Essential hypertension  -     CBC; Future  -     Comprehensive Metabolic Panel; Future  -     Urinalysis; Future  The current medical regimen is effective;  continue present plan and medications. Late onset Alzheimer's disease without behavioral disturbance (HCC)  History of mild cognitive impairment. As per patient and family symptom has not been getting worse. Patient does not like to try any medication. Bilateral pulmonary embolism (HCC)  History of bilateral pulmonary embolism. Patient was evaluated by pulmonologist and hematologist.  Patient discontinued Xarelto. He he did not wanted to continue with Xarelto and continue to wish so. Type 2 diabetes mellitus without complication, without long-term current use of insulin (HCC)  Currently diet controlled. -     Comprehensive Metabolic Panel; Future  -     TSH with Reflex; Future  -     Lipid Panel;  Future  -     Hemoglobin A1C; Future    Patient continued to decline to get up-to-date on shingles, Tdap, pneumonia vaccine        Orders Placed This Encounter   Procedures    CBC     Standing Status:   Future     Standing Expiration Date:   1/13/2023    Comprehensive Metabolic Panel     Standing Status:   Future     Standing Expiration Date:   1/13/2023    TSH with Reflex     Standing Status:   Future     Standing Expiration Date:   1/13/2023    Lipid Panel     Standing Status:   Future     Standing Expiration Date:   1/13/2023     Order Specific Question:   Is Patient Fasting?/# of Hours     Answer:   10    Hemoglobin A1C     Standing Status:   Future     Standing Expiration Date:   1/13/2023    Urinalysis     Standing Status:   Future     Standing Expiration Date:   1/13/2023     Current Outpatient Medications   Medication Sig Dispense Refill    hydroCHLOROthiazide (MICROZIDE) 12.5 MG capsule Take 1 capsule by mouth every morning 30 capsule 0    potassium chloride (KLOR-CON M) 10 MEQ extended release tablet Take 1 tablet by mouth daily 30 tablet 0     No current facility-administered medications for this visit. Return in about 6 months (around 7/13/2022) for AWV, chronic medication management, slums test.  An After Visit Summary was printed and given to the patient. Documentation was done using voice recognition dragon software. Every effort was made to ensure accuracy; however, inadvertent  Unintentional computerized transcription errors may be present.

## 2022-01-14 LAB
ESTIMATED AVERAGE GLUCOSE: 119.8 MG/DL
HBA1C MFR BLD: 5.8 %

## 2022-03-01 DIAGNOSIS — R60.0 BILATERAL LEG EDEMA: ICD-10-CM

## 2022-03-01 DIAGNOSIS — I26.99 BILATERAL PULMONARY EMBOLISM (HCC): ICD-10-CM

## 2022-03-01 RX ORDER — HYDROCHLOROTHIAZIDE 12.5 MG/1
CAPSULE, GELATIN COATED ORAL
Qty: 90 CAPSULE | Refills: 1 | Status: SHIPPED | OUTPATIENT
Start: 2022-03-01 | End: 2022-09-07

## 2022-03-01 RX ORDER — POTASSIUM CHLORIDE 750 MG/1
TABLET, EXTENDED RELEASE ORAL
Qty: 90 TABLET | Refills: 1 | Status: SHIPPED | OUTPATIENT
Start: 2022-03-01 | End: 2022-09-07

## 2022-07-13 ENCOUNTER — OFFICE VISIT (OUTPATIENT)
Dept: INTERNAL MEDICINE CLINIC | Age: 82
End: 2022-07-13
Payer: MEDICARE

## 2022-07-13 VITALS
WEIGHT: 201.4 LBS | HEIGHT: 73 IN | HEART RATE: 70 BPM | DIASTOLIC BLOOD PRESSURE: 80 MMHG | SYSTOLIC BLOOD PRESSURE: 144 MMHG | BODY MASS INDEX: 26.69 KG/M2 | OXYGEN SATURATION: 99 %

## 2022-07-13 DIAGNOSIS — I26.99 BILATERAL PULMONARY EMBOLISM (HCC): ICD-10-CM

## 2022-07-13 DIAGNOSIS — I10 ESSENTIAL HYPERTENSION: ICD-10-CM

## 2022-07-13 DIAGNOSIS — Z00.00 INITIAL MEDICARE ANNUAL WELLNESS VISIT: Primary | ICD-10-CM

## 2022-07-13 DIAGNOSIS — R41.89 COGNITIVE IMPAIRMENT: ICD-10-CM

## 2022-07-13 PROCEDURE — G0438 PPPS, INITIAL VISIT: HCPCS | Performed by: INTERNAL MEDICINE

## 2022-07-13 PROCEDURE — 99213 OFFICE O/P EST LOW 20 MIN: CPT | Performed by: INTERNAL MEDICINE

## 2022-07-13 PROCEDURE — 1123F ACP DISCUSS/DSCN MKR DOCD: CPT | Performed by: INTERNAL MEDICINE

## 2022-07-13 ASSESSMENT — LIFESTYLE VARIABLES: HOW OFTEN DO YOU HAVE A DRINK CONTAINING ALCOHOL: NEVER

## 2022-07-13 ASSESSMENT — PATIENT HEALTH QUESTIONNAIRE - PHQ9
2. FEELING DOWN, DEPRESSED OR HOPELESS: 0
SUM OF ALL RESPONSES TO PHQ QUESTIONS 1-9: 0
SUM OF ALL RESPONSES TO PHQ9 QUESTIONS 1 & 2: 0
SUM OF ALL RESPONSES TO PHQ QUESTIONS 1-9: 0
1. LITTLE INTEREST OR PLEASURE IN DOING THINGS: 0

## 2022-07-13 NOTE — PROGRESS NOTES
Medicare Annual Wellness Visit    Veronica Cuellar is here for Medicare AWV and Other (slum test)    Assessment & Plan   Initial Medicare annual wellness visit  Bilateral pulmonary embolism [facility])  Patient has been consistently declined to stay on any anticoagulant. No recurrence of symptoms. Denies cough shortness of breath wheezing or exertional dyspnea  Essential hypertension  We will continue current hydrochlorothiazide with potassium supplement. Patient and wife advised to call me back in the next few days about home blood pressure reading. Cognitive impairment  Patient declined further treatment. Slums score 20     Recommendations for Preventive Services Due: see orders and patient instructions/AVS.  Recommended screening schedule for the next 5-10 years is provided to the patient in written form: see Patient Instructions/AVS.     Return in 6 months (on 1/13/2023) for Medicare Annual Wellness Visit in 1 year. Subjective   The following acute and/or chronic problems were also addressed today:  Hypertension. Patient report having excellent blood pressure at home however he could not tell me the home blood pressure readings. He understands he is having some memory issue however he is reluctant to start any medication. Patient's complete Health Risk Assessment and screening values have been reviewed and are found in Flowsheets. The following problems were reviewed today and where indicated follow up appointments were made and/or referrals ordered. Positive Risk Factor Screenings with Interventions:     Cognitive:   Words recalled: 0 Words Recalled  Clock Drawing Test (CDT): (!) Abnormal  Total Score Interpretation: Abnormal Mini-Cog    Cognitive Impairment Interventions:  · Patient declines any further evaluation/treatment for cognitive impairment             General Health and ACP:  General  In general, how would you say your health is?: Good  In the past 7 days, have you experienced any of the Team:  Christopher Gordon MD as PCP - General (Internal Medicine)  Christopher Gordon MD as PCP - [facility] Empaneled Provider  Brandi Martinez MD (Internal Medicine)     Reviewed and updated this visit:  Tobacco  Allergies  Meds  Problems  Med Hx  Surg Hx  Soc Hx  Fam Hx        An After Visit Summary was printed and given to the patient. Documentation was done using voice recognition dragon software. Every effort was made to ensure accuracy; however, inadvertent  Unintentional computerized transcription errors may be present.

## 2022-07-13 NOTE — PATIENT INSTRUCTIONS
Please call me about your home blood pressure readings    Personalized Preventive Plan for Manju Ponce - 7/13/2022  Medicare offers a range of preventive health benefits. Some of the tests and screenings are paid in full while other may be subject to a deductible, co-insurance, and/or copay. Some of these benefits include a comprehensive review of your medical history including lifestyle, illnesses that may run in your family, and various assessments and screenings as appropriate. After reviewing your medical record and screening and assessments performed today your provider may have ordered immunizations, labs, imaging, and/or referrals for you. A list of these orders (if applicable) as well as your Preventive Care list are included within your After Visit Summary for your review. Other Preventive Recommendations:    · A preventive eye exam performed by an eye specialist is recommended every 1-2 years to screen for glaucoma; cataracts, macular degeneration, and other eye disorders. · A preventive dental visit is recommended every 6 months. · Try to get at least 150 minutes of exercise per week or 10,000 steps per day on a pedometer . · Order or download the FREE \"Exercise & Physical Activity: Your Everyday Guide\" from [employer]. Call 5-351.839.2557 or search [employer] online. · You need 4033-0246 mg of calcium and 7993-1660 IU of vitamin D per day. It is possible to meet your calcium requirement with diet alone, but a vitamin D supplement is usually necessary to meet this goal.  · When exposed to the sun, use a sunscreen that protects against both UVA and UVB radiation with an SPF of 30 or greater. Reapply every 2 to 3 hours or after sweating, drying off with a towel, or swimming. · Always wear a seat belt when traveling in a car. Always wear a helmet when riding a bicycle or motorcycle.

## 2022-09-07 DIAGNOSIS — I26.99 BILATERAL PULMONARY EMBOLISM (HCC): ICD-10-CM

## 2022-09-07 DIAGNOSIS — R60.0 BILATERAL LEG EDEMA: ICD-10-CM

## 2022-09-07 RX ORDER — HYDROCHLOROTHIAZIDE 12.5 MG/1
CAPSULE, GELATIN COATED ORAL
Qty: 90 CAPSULE | Refills: 1 | Status: SHIPPED | OUTPATIENT
Start: 2022-09-07

## 2022-09-07 RX ORDER — POTASSIUM CHLORIDE 750 MG/1
TABLET, EXTENDED RELEASE ORAL
Qty: 90 TABLET | Refills: 1 | Status: SHIPPED | OUTPATIENT
Start: 2022-09-07

## 2023-02-22 ENCOUNTER — OFFICE VISIT (OUTPATIENT)
Dept: INTERNAL MEDICINE CLINIC | Age: 83
End: 2023-02-22

## 2023-02-22 VITALS
OXYGEN SATURATION: 98 % | HEART RATE: 62 BPM | BODY MASS INDEX: 25.28 KG/M2 | WEIGHT: 191.6 LBS | DIASTOLIC BLOOD PRESSURE: 80 MMHG | SYSTOLIC BLOOD PRESSURE: 128 MMHG

## 2023-02-22 DIAGNOSIS — F02.80 LATE ONSET ALZHEIMER'S DISEASE WITHOUT BEHAVIORAL DISTURBANCE (HCC): ICD-10-CM

## 2023-02-22 DIAGNOSIS — E11.9 TYPE 2 DIABETES MELLITUS WITHOUT COMPLICATION, WITHOUT LONG-TERM CURRENT USE OF INSULIN (HCC): ICD-10-CM

## 2023-02-22 DIAGNOSIS — I26.99 BILATERAL PULMONARY EMBOLISM (HCC): ICD-10-CM

## 2023-02-22 DIAGNOSIS — G30.1 LATE ONSET ALZHEIMER'S DISEASE WITHOUT BEHAVIORAL DISTURBANCE (HCC): ICD-10-CM

## 2023-02-22 DIAGNOSIS — I10 ESSENTIAL HYPERTENSION: ICD-10-CM

## 2023-02-22 DIAGNOSIS — I10 ESSENTIAL HYPERTENSION: Primary | ICD-10-CM

## 2023-02-22 LAB
A/G RATIO: 1.3 (ref 1.1–2.2)
ALBUMIN SERPL-MCNC: 4 G/DL (ref 3.4–5)
ALP BLD-CCNC: 33 U/L (ref 40–129)
ALT SERPL-CCNC: 11 U/L (ref 10–40)
ANION GAP SERPL CALCULATED.3IONS-SCNC: 9 MMOL/L (ref 3–16)
AST SERPL-CCNC: 18 U/L (ref 15–37)
BILIRUB SERPL-MCNC: 0.7 MG/DL (ref 0–1)
BUN BLDV-MCNC: 19 MG/DL (ref 7–20)
CALCIUM SERPL-MCNC: 9.6 MG/DL (ref 8.3–10.6)
CHLORIDE BLD-SCNC: 100 MMOL/L (ref 99–110)
CO2: 32 MMOL/L (ref 21–32)
CREAT SERPL-MCNC: 1.2 MG/DL (ref 0.8–1.3)
GFR SERPL CREATININE-BSD FRML MDRD: >60 ML/MIN/{1.73_M2}
GLUCOSE BLD-MCNC: 106 MG/DL (ref 70–99)
HCT VFR BLD CALC: 41 % (ref 40.5–52.5)
HEMOGLOBIN: 13.8 G/DL (ref 13.5–17.5)
MCH RBC QN AUTO: 29.6 PG (ref 26–34)
MCHC RBC AUTO-ENTMCNC: 33.7 G/DL (ref 31–36)
MCV RBC AUTO: 87.9 FL (ref 80–100)
PDW BLD-RTO: 13.2 % (ref 12.4–15.4)
PLATELET # BLD: 160 K/UL (ref 135–450)
PMV BLD AUTO: 8.3 FL (ref 5–10.5)
POTASSIUM SERPL-SCNC: 4.2 MMOL/L (ref 3.5–5.1)
RBC # BLD: 4.67 M/UL (ref 4.2–5.9)
SODIUM BLD-SCNC: 141 MMOL/L (ref 136–145)
TOTAL PROTEIN: 7.1 G/DL (ref 6.4–8.2)
TSH REFLEX: 0.73 UIU/ML (ref 0.27–4.2)
WBC # BLD: 3.7 K/UL (ref 4–11)

## 2023-02-22 RX ORDER — VITAMIN B COMPLEX
1 CAPSULE ORAL DAILY
COMMUNITY

## 2023-02-22 RX ORDER — ASCORBIC ACID 500 MG
TABLET ORAL DAILY
COMMUNITY

## 2023-02-22 ASSESSMENT — ENCOUNTER SYMPTOMS
WHEEZING: 0
CHEST TIGHTNESS: 0
TROUBLE SWALLOWING: 0
VOICE CHANGE: 0
VOMITING: 0
SHORTNESS OF BREATH: 0
ABDOMINAL PAIN: 0
NAUSEA: 0
PHOTOPHOBIA: 0

## 2023-02-22 NOTE — PROGRESS NOTES
[facility]  1940  male  81 y.o. SUBJECTIVE:       Chief Complaint   Patient presents with    Follow-up    Hypertension    Diabetes       HPI:  Follow-up visit. Patient denies any new or concerning symptoms. Patient denies any exertional chest pain, dyspnea, palpitations, syncope, orthopnea, edema or paroxysmal nocturnal dyspnea. The patient denies cough, chest pain, dyspnea, wheezing or hemoptysis. The patient denies any symptoms of neurological impairment or TIA's; no amaurosis, diplopia, dysphasia, or unilateral disturbance of motor or sensory function. No loss of balance or vertigo. Past Medical History:   Diagnosis Date    DVT (deep venous thrombosis) (HCC)     Dysuria     Hypertension     Prostate enlargement      Past Surgical History:   Procedure Laterality Date    PROSTATE SURGERY       Social History     Socioeconomic History    Marital status:      Spouse name: None    Number of children: 3    Years of education: None    Highest education level: None   Occupational History    Occupation: retired    Tobacco Use    Smoking status: Former     Packs/day: 2.00     Years: 12.00     Pack years: 24.00     Types: Cigarettes     Quit date: 1972     Years since quittin.0    Smokeless tobacco: Never    Tobacco comments:     started to smoke at 26 / smoked up to 2 ppd    Vaping Use    Vaping Use: Never used   Substance and Sexual Activity    Alcohol use: No    Drug use: No    Sexual activity: Yes     Social Determinants of Health     Physical Activity: Sufficiently Active    Days of Exercise per Week: 5 days    Minutes of Exercise per Session: 40 min     Family History   Problem Relation Age of Onset    Asthma Sister     Other Sister        Review of Systems   Constitutional:  Negative for diaphoresis and unexpected weight change. HENT:  Negative for trouble swallowing and voice change. Eyes:  Negative for photophobia and visual disturbance.    Respiratory:  Negative for chest tightness, shortness of breath and wheezing. Cardiovascular:  Negative for chest pain and palpitations. Gastrointestinal:  Negative for abdominal pain, nausea and vomiting. Neurological:  Negative for dizziness and light-headedness. OBJECTIVE:  Pulse Readings from Last 4 Encounters:   02/22/23 62   07/13/22 70   01/13/22 67   07/13/21 68     Wt Readings from Last 4 Encounters:   02/22/23 191 lb 9.6 oz (86.9 kg)   07/13/22 201 lb 6.4 oz (91.4 kg)   01/13/22 193 lb 12.8 oz (87.9 kg)   07/13/21 180 lb (81.6 kg)     BP Readings from Last 4 Encounters:   02/22/23 128/80   07/13/22 (!) 144/80   01/13/22 137/80   07/13/21 122/70     Physical Exam  Vitals and nursing note reviewed. Constitutional:       Appearance: Normal appearance. He is not ill-appearing. Eyes:      Conjunctiva/sclera: Conjunctivae normal.   Cardiovascular:      Rate and Rhythm: Normal rate and regular rhythm. Heart sounds: Normal heart sounds. Pulmonary:      Effort: Pulmonary effort is normal.      Breath sounds: Normal breath sounds. Abdominal:      General: Bowel sounds are normal.   Musculoskeletal:      Right lower leg: No edema. Left lower leg: No edema. Neurological:      General: No focal deficit present. Mental Status: He is alert and oriented to person, place, and time.    Psychiatric:         Mood and Affect: Mood normal.         Behavior: Behavior normal.       CBC:   Lab Results   Component Value Date/Time    WBC 3.3 01/13/2022 08:49 AM    HGB 13.3 01/13/2022 08:49 AM    HCT 39.3 01/13/2022 08:49 AM     01/13/2022 08:49 AM     CMP:  Lab Results   Component Value Date/Time     01/13/2022 08:49 AM    K 3.9 01/13/2022 08:49 AM     01/13/2022 08:49 AM    CO2 32 01/13/2022 08:49 AM    ANIONGAP 9 01/13/2022 08:49 AM    GLUCOSE 112 01/13/2022 08:49 AM    GLUCOSE 113 09/11/2020 09:15 AM    BUN 17 01/13/2022 08:49 AM    CREATININE 1.2 01/13/2022 08:49 AM    GFRAA >60 01/13/2022 08:49 AM CALCIUM 9.4 01/13/2022 08:49 AM    PROT 7.0 01/13/2022 08:49 AM    LABALBU 3.8 01/13/2022 08:49 AM    AGRATIO 1.2 01/13/2022 08:49 AM    BILITOT 0.7 01/13/2022 08:49 AM    ALKPHOS 31 01/13/2022 08:49 AM    ALT 11 01/13/2022 08:49 AM    AST 23 01/13/2022 08:49 AM    GLOB 2.5 09/24/2020 03:27 PM     URINALYSIS:  Lab Results   Component Value Date/Time    GLUCOSEU Negative 01/13/2022 08:54 AM    KETUA Negative 01/13/2022 08:54 AM    SPECGRAV 1.014 01/13/2022 08:54 AM    BLOODU Negative 01/13/2022 08:54 AM    PHUR 7.0 01/13/2022 08:54 AM    PROTEINU Negative 01/13/2022 08:54 AM    NITRU Negative 01/13/2022 08:54 AM    LEUKOCYTESUR Negative 01/13/2022 08:54 AM    LABMICR Not Indicated 01/13/2022 08:54 AM    URINETYPE [place] 01/13/2022 08:54 AM     HBA1C:   Lab Results   Component Value Date/Time    LABA1C 5.8 01/13/2022 08:49 AM    .8 01/13/2022 08:49 AM     MICRO/ALB:   Lab Results   Component Value Date/Time    LABMICR Not Indicated 01/13/2022 08:54 AM     LIPID:  Lab Results   Component Value Date/Time    CHOL 199 01/13/2022 08:49 AM    TRIG 40 01/13/2022 08:49 AM    HDL 68 01/13/2022 08:49 AM    LDLCALC 123 01/13/2022 08:49 AM    LABVLDL 8 01/13/2022 08:49 AM     TSH:   Lab Results   Component Value Date/Time    TSHREFLEX 0.92 01/13/2022 08:49 AM       ASSESSMENT/PLAN:  Assessment/Plan:  Maricarmen Bullock was seen today for follow-up, hypertension and diabetes. Diagnoses and all orders for this visit:    Essential hypertension  Patient denies any exertional chest pain, dyspnea, palpitations, syncope, orthopnea, edema or paroxysmal nocturnal dyspnea. We will continue current hydrochlorothiazide with potassium supplement. -     CBC; Future  -     Comprehensive Metabolic Panel; Future  -     TSH with Reflex; Future    Bilateral pulmonary embolism (HCC)  History of pulmonary embolism. Patient himself stopped taking Xarelto in the past.  Did not want to continue Xarelto.   No further recurrence of symptoms  Late onset Alzheimer's disease without behavioral disturbance [facility])  Patient report he does get forget fullness. His wife and he stay together. His 3 children always keep track of him. He denies any worsening of his baseline symptoms. He denies getting lost while driving. Type 2 diabetes mellitus without complication, without long-term current use of insulin ([address]  Mostly diet controlled. -     Cancel: Lipid Panel; Future  -     Hemoglobin A1C; Future  -     Urinalysis; Future              Orders Placed This Encounter   Procedures    CBC     Standing Status:   Future     Standing Expiration Date:   2/22/2024    Comprehensive Metabolic Panel     Standing Status:   Future     Standing Expiration Date:   2/22/2024    TSH with Reflex     Standing Status:   Future     Standing Expiration Date:   2/22/2024    Hemoglobin A1C     Standing Status:   Future     Standing Expiration Date:   2/22/2024    Urinalysis     Standing Status:   Future     Standing Expiration Date:   2/22/2024     Current Outpatient Medications   Medication Sig Dispense Refill    vitamin C (ASCORBIC ACID) 500 MG tablet Take by mouth daily      Cobalamin Combinations (B12 FOLATE PO) Take by mouth      Multiple Vitamin (MULTI-DAY PO) Take by mouth      b complex vitamins capsule Take 1 capsule by mouth daily      VITAMIN D PO Take by mouth      Coenzyme Q10 (COQ10 PO) Take by mouth      potassium chloride (KLOR-CON M) 10 MEQ extended release tablet TAKE 1 TABLET BY MOUTH EVERY DAY 90 tablet 1    hydroCHLOROthiazide (MICROZIDE) 12.5 MG capsule TAKE 1 CAPSULE BY MOUTH EVERY DAY 90 capsule 1     No current facility-administered medications for this visit. Return in about 4 months (around 6/22/2023). An After Visit Summary was printed and given to the patient. Documentation was done using voice recognition dragon software. Every effort was made to ensure accuracy; however, inadvertent  Unintentional computerized transcription errors may be present.

## 2023-02-23 LAB
ESTIMATED AVERAGE GLUCOSE: 119.8 MG/DL
HBA1C MFR BLD: 5.8 %

## 2023-03-22 DIAGNOSIS — R60.0 BILATERAL LEG EDEMA: ICD-10-CM

## 2023-03-22 DIAGNOSIS — I26.99 BILATERAL PULMONARY EMBOLISM (HCC): ICD-10-CM

## 2023-03-22 RX ORDER — POTASSIUM CHLORIDE 750 MG/1
TABLET, EXTENDED RELEASE ORAL
Qty: 90 TABLET | Refills: 1 | Status: SHIPPED | OUTPATIENT
Start: 2023-03-22

## 2023-03-22 RX ORDER — HYDROCHLOROTHIAZIDE 12.5 MG/1
CAPSULE, GELATIN COATED ORAL
Qty: 90 CAPSULE | Refills: 1 | Status: SHIPPED | OUTPATIENT
Start: 2023-03-22

## 2023-09-14 DIAGNOSIS — I26.99 BILATERAL PULMONARY EMBOLISM (HCC): ICD-10-CM

## 2023-09-14 DIAGNOSIS — R60.0 BILATERAL LEG EDEMA: ICD-10-CM

## 2023-09-14 RX ORDER — HYDROCHLOROTHIAZIDE 12.5 MG/1
CAPSULE, GELATIN COATED ORAL
Qty: 90 CAPSULE | Refills: 1 | Status: SHIPPED | OUTPATIENT
Start: 2023-09-14

## 2023-09-14 RX ORDER — POTASSIUM CHLORIDE 750 MG/1
TABLET, EXTENDED RELEASE ORAL
Qty: 90 TABLET | Refills: 1 | Status: SHIPPED | OUTPATIENT
Start: 2023-09-14

## 2023-12-14 ENCOUNTER — OFFICE VISIT (OUTPATIENT)
Dept: INTERNAL MEDICINE CLINIC | Age: 83
End: 2023-12-14

## 2023-12-14 VITALS
SYSTOLIC BLOOD PRESSURE: 130 MMHG | OXYGEN SATURATION: 100 % | WEIGHT: 178.2 LBS | DIASTOLIC BLOOD PRESSURE: 78 MMHG | BODY MASS INDEX: 23.62 KG/M2 | HEIGHT: 73 IN | HEART RATE: 51 BPM

## 2023-12-14 VITALS — BODY MASS INDEX: 23.5 KG/M2 | WEIGHT: 178.13 LBS

## 2023-12-14 DIAGNOSIS — Z00.00 MEDICARE ANNUAL WELLNESS VISIT, SUBSEQUENT: Primary | ICD-10-CM

## 2023-12-14 DIAGNOSIS — E11.9 TYPE 2 DIABETES MELLITUS WITHOUT COMPLICATION, WITHOUT LONG-TERM CURRENT USE OF INSULIN (HCC): ICD-10-CM

## 2023-12-14 DIAGNOSIS — I10 ESSENTIAL HYPERTENSION: Primary | ICD-10-CM

## 2023-12-14 DIAGNOSIS — M17.0 PRIMARY OSTEOARTHRITIS OF BOTH KNEES: ICD-10-CM

## 2023-12-14 DIAGNOSIS — I10 ESSENTIAL HYPERTENSION: ICD-10-CM

## 2023-12-14 LAB
ANION GAP SERPL CALCULATED.3IONS-SCNC: 6 MMOL/L (ref 3–16)
BUN SERPL-MCNC: 21 MG/DL (ref 7–20)
CALCIUM SERPL-MCNC: 9.2 MG/DL (ref 8.3–10.6)
CHLORIDE SERPL-SCNC: 98 MMOL/L (ref 99–110)
CO2 SERPL-SCNC: 36 MMOL/L (ref 21–32)
CREAT SERPL-MCNC: 1.1 MG/DL (ref 0.8–1.3)
DEPRECATED RDW RBC AUTO: 13.4 % (ref 12.4–15.4)
GFR SERPLBLD CREATININE-BSD FMLA CKD-EPI: >60 ML/MIN/{1.73_M2}
GLUCOSE SERPL-MCNC: 100 MG/DL (ref 70–99)
HCT VFR BLD AUTO: 40.8 % (ref 40.5–52.5)
HGB BLD-MCNC: 13.5 G/DL (ref 13.5–17.5)
MCH RBC QN AUTO: 29.7 PG (ref 26–34)
MCHC RBC AUTO-ENTMCNC: 33.1 G/DL (ref 31–36)
MCV RBC AUTO: 89.8 FL (ref 80–100)
PLATELET # BLD AUTO: 172 K/UL (ref 135–450)
PMV BLD AUTO: 8.7 FL (ref 5–10.5)
POTASSIUM SERPL-SCNC: 3.8 MMOL/L (ref 3.5–5.1)
RBC # BLD AUTO: 4.54 M/UL (ref 4.2–5.9)
SODIUM SERPL-SCNC: 140 MMOL/L (ref 136–145)
WBC # BLD AUTO: 4 K/UL (ref 4–11)

## 2023-12-14 SDOH — ECONOMIC STABILITY: HOUSING INSECURITY
IN THE LAST 12 MONTHS, WAS THERE A TIME WHEN YOU DID NOT HAVE A STEADY PLACE TO SLEEP OR SLEPT IN A SHELTER (INCLUDING NOW)?: NO

## 2023-12-14 SDOH — ECONOMIC STABILITY: INCOME INSECURITY: HOW HARD IS IT FOR YOU TO PAY FOR THE VERY BASICS LIKE FOOD, HOUSING, MEDICAL CARE, AND HEATING?: NOT HARD AT ALL

## 2023-12-14 SDOH — ECONOMIC STABILITY: FOOD INSECURITY: WITHIN THE PAST 12 MONTHS, THE FOOD YOU BOUGHT JUST DIDN'T LAST AND YOU DIDN'T HAVE MONEY TO GET MORE.: NEVER TRUE

## 2023-12-14 SDOH — ECONOMIC STABILITY: FOOD INSECURITY: WITHIN THE PAST 12 MONTHS, YOU WORRIED THAT YOUR FOOD WOULD RUN OUT BEFORE YOU GOT MONEY TO BUY MORE.: NEVER TRUE

## 2023-12-14 ASSESSMENT — PATIENT HEALTH QUESTIONNAIRE - PHQ9
1. LITTLE INTEREST OR PLEASURE IN DOING THINGS: 0
2. FEELING DOWN, DEPRESSED OR HOPELESS: 0
SUM OF ALL RESPONSES TO PHQ9 QUESTIONS 1 & 2: 0
SUM OF ALL RESPONSES TO PHQ QUESTIONS 1-9: 0

## 2023-12-14 ASSESSMENT — LIFESTYLE VARIABLES
HOW MANY STANDARD DRINKS CONTAINING ALCOHOL DO YOU HAVE ON A TYPICAL DAY: PATIENT DOES NOT DRINK
HOW OFTEN DO YOU HAVE A DRINK CONTAINING ALCOHOL: NEVER

## 2023-12-14 NOTE — PROGRESS NOTES
Alma  1940  male  81 y.o. SUBJECTIVE:       Chief Complaint   Patient presents with    6 Month Follow-Up     AWV after PCP visit with RN    Hypertension    Diabetes       HPI:  Follow-up visit for chronic problems. Patient is requesting disabled parking placard. On further questioning patient wife noticed that patient complaining of knee pain on walking in couple of 100 feet. Hypertension:    Alma returns for follow up of hypertension. Tolerating medications well and taking them as directed. Does not check BP at home. No symptoms (denies chest pain,dyspnea,edema or TIA's or blurred vision) concerning for end organ damage are present. He continue to try to eat fairly healthy food and concern about weight gain. Patient denies any GI symptoms, fever or chills nausea vomiting abdominal pain, cough fever chills    History of dementia. Continues to have forgetfulness.   Patient does not want to take any medication    Past Medical History:   Diagnosis Date    DVT (deep venous thrombosis) (HCC)     Dysuria     Hypertension     Prostate enlargement      Past Surgical History:   Procedure Laterality Date    PROSTATE SURGERY       Social History     Socioeconomic History    Marital status:      Spouse name: None    Number of children: 3    Years of education: None    Highest education level: None   Occupational History    Occupation: retired    Tobacco Use    Smoking status: Former     Packs/day: 2.00     Years: 12.00     Additional pack years: 0.00     Total pack years: 24.00     Types: Cigarettes     Quit date: 1972     Years since quittin.9    Smokeless tobacco: Never    Tobacco comments:     started to smoke at 26 / smoked up to 2 ppd    Vaping Use    Vaping Use: Never used   Substance and Sexual Activity    Alcohol use: No    Drug use: No    Sexual activity: Yes     Social Determinants of Health     Financial Resource Strain: Low Risk  (2023)    Overall

## 2023-12-15 LAB
EST. AVERAGE GLUCOSE BLD GHB EST-MCNC: 114 MG/DL
HBA1C MFR BLD: 5.6 %

## 2024-03-01 ENCOUNTER — TELEPHONE (OUTPATIENT)
Dept: ENT CLINIC | Age: 84
End: 2024-03-01

## 2024-03-01 NOTE — TELEPHONE ENCOUNTER
Pt is scheduled for first available but would like to be seen sooner due to the pain he is experiencing. Please advise. Thank you.

## 2024-03-25 DIAGNOSIS — R60.0 BILATERAL LEG EDEMA: ICD-10-CM

## 2024-03-25 DIAGNOSIS — I26.99 BILATERAL PULMONARY EMBOLISM (HCC): ICD-10-CM

## 2024-03-25 RX ORDER — HYDROCHLOROTHIAZIDE 12.5 MG/1
CAPSULE, GELATIN COATED ORAL
Qty: 90 CAPSULE | Refills: 1 | Status: SHIPPED | OUTPATIENT
Start: 2024-03-25

## 2024-03-25 RX ORDER — POTASSIUM CHLORIDE 750 MG/1
10 TABLET, EXTENDED RELEASE ORAL DAILY
Qty: 90 TABLET | Refills: 1 | Status: SHIPPED | OUTPATIENT
Start: 2024-03-25

## 2024-03-25 NOTE — TELEPHONE ENCOUNTER
Pt is requesting a refill on klor-con and microzide. Please send to [facility] pharmacy.   Last OV: 12/14/2023  Next OV: 6/14/2024    Last fill: 9/14/2023  Refills:

## 2024-03-25 NOTE — TELEPHONE ENCOUNTER
Medication:   Requested Prescriptions     Pending Prescriptions Disp Refills    potassium chloride (KLOR-CON M) 10 MEQ extended release tablet 90 tablet 1     Sig: Take 1 tablet by mouth daily    hydroCHLOROthiazide 12.5 MG capsule 90 capsule 1     Sig: TAKE 1 CAPSULE BY MOUTH EVERY DAY        Last Filled:  9/14/23    Patient Phone Number: [phone] (home)     Last appt: 12/14/2023   Next appt: 6/14/2024    Last OARRS:        No data to display

## 2024-06-14 ENCOUNTER — OFFICE VISIT (OUTPATIENT)
Dept: INTERNAL MEDICINE CLINIC | Age: 84
End: 2024-06-14

## 2024-06-14 VITALS
OXYGEN SATURATION: 99 % | HEIGHT: 73 IN | BODY MASS INDEX: 22.35 KG/M2 | WEIGHT: 168.6 LBS | SYSTOLIC BLOOD PRESSURE: 130 MMHG | DIASTOLIC BLOOD PRESSURE: 72 MMHG | HEART RATE: 70 BPM | TEMPERATURE: 97.3 F

## 2024-06-14 DIAGNOSIS — Z00.00 MEDICARE ANNUAL WELLNESS VISIT, SUBSEQUENT: Primary | ICD-10-CM

## 2024-06-14 DIAGNOSIS — I26.99 BILATERAL PULMONARY EMBOLISM (HCC): ICD-10-CM

## 2024-06-14 DIAGNOSIS — I10 ESSENTIAL HYPERTENSION: ICD-10-CM

## 2024-06-14 DIAGNOSIS — F02.80 LATE ONSET ALZHEIMER'S DISEASE WITHOUT BEHAVIORAL DISTURBANCE (HCC): ICD-10-CM

## 2024-06-14 DIAGNOSIS — R63.4 WEIGHT LOSS: ICD-10-CM

## 2024-06-14 DIAGNOSIS — R68.81 EARLY SATIETY: ICD-10-CM

## 2024-06-14 DIAGNOSIS — Z86.19 HISTORY OF HELICOBACTER PYLORI INFECTION: ICD-10-CM

## 2024-06-14 DIAGNOSIS — G30.1 LATE ONSET ALZHEIMER'S DISEASE WITHOUT BEHAVIORAL DISTURBANCE (HCC): ICD-10-CM

## 2024-06-14 DIAGNOSIS — E11.9 TYPE 2 DIABETES MELLITUS WITHOUT COMPLICATION, WITHOUT LONG-TERM CURRENT USE OF INSULIN (HCC): ICD-10-CM

## 2024-06-14 LAB
BILIRUB UR QL STRIP.AUTO: NEGATIVE
CLARITY UR: CLEAR
COLOR UR: YELLOW
DEPRECATED RDW RBC AUTO: 13.5 % (ref 12.4–15.4)
GLUCOSE UR STRIP.AUTO-MCNC: NEGATIVE MG/DL
HCT VFR BLD AUTO: 38.2 % (ref 40.5–52.5)
HGB BLD-MCNC: 13 G/DL (ref 13.5–17.5)
HGB UR QL STRIP.AUTO: NEGATIVE
KETONES UR STRIP.AUTO-MCNC: NEGATIVE MG/DL
LEUKOCYTE ESTERASE UR QL STRIP.AUTO: NEGATIVE
MCH RBC QN AUTO: 30.3 PG (ref 26–34)
MCHC RBC AUTO-ENTMCNC: 34 G/DL (ref 31–36)
MCV RBC AUTO: 89.3 FL (ref 80–100)
NITRITE UR QL STRIP.AUTO: NEGATIVE
PH UR STRIP.AUTO: 7 [PH] (ref 5–8)
PLATELET # BLD AUTO: 181 K/UL (ref 135–450)
PMV BLD AUTO: 8.4 FL (ref 5–10.5)
PROT UR STRIP.AUTO-MCNC: NEGATIVE MG/DL
RBC # BLD AUTO: 4.28 M/UL (ref 4.2–5.9)
SP GR UR STRIP.AUTO: 1.01 (ref 1–1.03)
UA DIPSTICK W REFLEX MICRO PNL UR: NORMAL
URN SPEC COLLECT METH UR: NORMAL
UROBILINOGEN UR STRIP-ACNC: 0.2 E.U./DL
WBC # BLD AUTO: 3.8 K/UL (ref 4–11)

## 2024-06-14 ASSESSMENT — PATIENT HEALTH QUESTIONNAIRE - PHQ9
SUM OF ALL RESPONSES TO PHQ QUESTIONS 1-9: 0
SUM OF ALL RESPONSES TO PHQ QUESTIONS 1-9: 0
1. LITTLE INTEREST OR PLEASURE IN DOING THINGS: NOT AT ALL
SUM OF ALL RESPONSES TO PHQ QUESTIONS 1-9: 0
2. FEELING DOWN, DEPRESSED OR HOPELESS: NOT AT ALL
SUM OF ALL RESPONSES TO PHQ QUESTIONS 1-9: 0
SUM OF ALL RESPONSES TO PHQ9 QUESTIONS 1 & 2: 0

## 2024-06-14 ASSESSMENT — LIFESTYLE VARIABLES
HOW OFTEN DO YOU HAVE A DRINK CONTAINING ALCOHOL: NEVER
HOW MANY STANDARD DRINKS CONTAINING ALCOHOL DO YOU HAVE ON A TYPICAL DAY: PATIENT DOES NOT DRINK

## 2024-06-14 NOTE — PROGRESS NOTES
dyspnea, palpitations, syncope, orthopnea, edema or paroxysmal nocturnal dyspnea.  The patient denies cough, chest pain, dyspnea, wheezing or hemoptysis.  The patient denies any symptoms of neurological impairment or TIA's; no amaurosis, diplopia, dysphasia, or unilateral disturbance of motor or sensory function. No loss of balance or vertigo.      Patient's complete Health Risk Assessment and screening values have been reviewed and are found in Flowsheets. The following problems were reviewed today and where indicated follow up appointments were made and/or referrals ordered.    Positive Risk Factor Screenings with Interventions:       Cognitive:   Clock Drawing Test (CDT): (!) Abnormal  Words recalled: 2 Words Recalled  Total Score: (!) 2  Total Score Interpretation: Abnormal Mini-Cog  Interventions:  Patient declines any further evaluation or treatment  Declined medication management               Vision Screen:  Do you have difficulty driving, watching TV, or doing any of your daily activities because of your eyesight?: No  Have you had an eye exam within the past year?: (!) No  No results found.    Interventions:   Patient encouraged to make appointment with their eye specialist      Advanced Directives:  Do you have a Living Will?: (!) No    Intervention:  has NO advanced directive - not interested in additional information             Wt Readings from Last 3 Encounters:   06/14/24 76.5 kg (168 lb 9.6 oz)   12/14/23 80.8 kg (178 lb 2.1 oz)   12/14/23 80.8 kg (178 lb 3.2 oz)             Objective   Vitals:    06/14/24 1005   BP: 130/72   Pulse: 70   Temp: 97.3 °F (36.3 °C)   TempSrc: Temporal   SpO2: 99%   Weight: 76.5 kg (168 lb 9.6 oz)   Height: 1.854 m (6' 1\")      Body mass index is 22.24 kg/m².      General Appearance: alert and oriented to person, place and time, well-developed and well-nourished, in no acute distress  Eyes: pupils equal, round, and reactive to light, extraocular eye movements intact,

## 2024-06-14 NOTE — PATIENT INSTRUCTIONS
home?  Keeping your body active can help slow MCI. Exercises like walking can help. Try to stay active mentally too. Read or do things like crossword puzzles if you enjoy doing them.  If you need help coping with MCI, you may want to get support from family, friends, a support group, or a counselor who works with people who have MCI.  Though the future isn't always clear, it can be good to plan ahead with instructions for your care. These are called advanced directives. Having a plan can help make sure that you get the care you want.  Current as of: December 20, 2023  Content Version: 14.1  © 2006-2024 [employer].   Care instructions adapted under license by [employer]. If you have questions about a medical condition or this instruction, always ask your healthcare professional. [employer] disclaims any warranty or liability for your use of this information.           Learning About Vision Tests  What are vision tests?     The four most common vision tests are visual acuity tests, refraction, visual field tests, and color vision tests.  Visual acuity (sharpness) tests  These tests are used:  To see if you need glasses or contact lenses.  To monitor an eye problem.  To check an eye injury.  Visual acuity tests are done as part of routine exams. You may also have this test when you get your 's license or apply for some types of jobs.  Visual field tests  These tests are used:  To check for vision loss in any area of your range of vision.  To screen for certain eye diseases.  To look for nerve damage after a stroke, head injury, or other problem that could reduce blood flow to the brain.  Refraction and color tests  A refraction test is done to find the right prescription for glasses and contact lenses.  A color vision test is done to check for color blindness.  Color vision is often tested as part of a routine exam. You may also have this test when you apply for a job where

## 2024-06-15 LAB
ALBUMIN SERPL-MCNC: 4.3 G/DL (ref 3.4–5)
ALBUMIN/GLOB SERPL: 1.4 {RATIO} (ref 1.1–2.2)
ALP SERPL-CCNC: 37 U/L (ref 40–129)
ALT SERPL-CCNC: 13 U/L (ref 10–40)
ANION GAP SERPL CALCULATED.3IONS-SCNC: 11 MMOL/L (ref 3–16)
AST SERPL-CCNC: 19 U/L (ref 15–37)
BILIRUB SERPL-MCNC: 0.6 MG/DL (ref 0–1)
BUN SERPL-MCNC: 16 MG/DL (ref 7–20)
CALCIUM SERPL-MCNC: 10.1 MG/DL (ref 8.3–10.6)
CHLORIDE SERPL-SCNC: 95 MMOL/L (ref 99–110)
CO2 SERPL-SCNC: 31 MMOL/L (ref 21–32)
CREAT SERPL-MCNC: 1 MG/DL (ref 0.8–1.3)
EST. AVERAGE GLUCOSE BLD GHB EST-MCNC: 114 MG/DL
GFR SERPLBLD CREATININE-BSD FMLA CKD-EPI: 75 ML/MIN/{1.73_M2}
GLUCOSE SERPL-MCNC: 85 MG/DL (ref 70–99)
HBA1C MFR BLD: 5.6 %
POTASSIUM SERPL-SCNC: 4.4 MMOL/L (ref 3.5–5.1)
PROT SERPL-MCNC: 7.3 G/DL (ref 6.4–8.2)
SODIUM SERPL-SCNC: 137 MMOL/L (ref 136–145)
TSH SERPL DL<=0.005 MIU/L-ACNC: 0.69 UIU/ML (ref 0.27–4.2)

## 2024-06-16 NOTE — RESULT ENCOUNTER NOTE
Mild anemia and leukopenia.  Other labs are stable.  Make sure patient make appointment with gastroenterologist as advised

## 2024-07-03 ENCOUNTER — TELEPHONE (OUTPATIENT)
Dept: INTERNAL MEDICINE CLINIC | Age: 84
End: 2024-07-03

## 2024-07-03 NOTE — TELEPHONE ENCOUNTER
Patient called stated he was told to take omeprazole Otc he states the box states he can only take it 14 days. Patient would like to know how long can he continue to take them longer then 14 days

## 2024-07-03 NOTE — TELEPHONE ENCOUNTER
Patient can take omeprazole more than 14 days as needed for epigastric pain.  However considering his previous conditions of H. pylori infection, he must visit his gastroenterologist as advised

## 2024-08-20 ENCOUNTER — TELEPHONE (OUTPATIENT)
Dept: INTERNAL MEDICINE CLINIC | Age: 84
End: 2024-08-20

## 2024-08-20 NOTE — TELEPHONE ENCOUNTER
Dr Hedrick Heppart office calling asking for pt med list and A1C please fax to [phone] attn:Ira---thanks.

## 2024-09-12 ENCOUNTER — OFFICE VISIT (OUTPATIENT)
Dept: INTERNAL MEDICINE CLINIC | Age: 84
End: 2024-09-12

## 2024-09-12 VITALS
DIASTOLIC BLOOD PRESSURE: 70 MMHG | HEART RATE: 66 BPM | WEIGHT: 170 LBS | BODY MASS INDEX: 22.53 KG/M2 | SYSTOLIC BLOOD PRESSURE: 122 MMHG | OXYGEN SATURATION: 99 % | HEIGHT: 73 IN

## 2024-09-12 DIAGNOSIS — K21.9 GASTROESOPHAGEAL REFLUX DISEASE WITHOUT ESOPHAGITIS: ICD-10-CM

## 2024-09-12 DIAGNOSIS — E11.9 TYPE 2 DIABETES MELLITUS WITHOUT COMPLICATION, WITHOUT LONG-TERM CURRENT USE OF INSULIN (HCC): ICD-10-CM

## 2024-09-12 DIAGNOSIS — R60.0 BILATERAL LEG EDEMA: ICD-10-CM

## 2024-09-12 DIAGNOSIS — I10 ESSENTIAL HYPERTENSION: Primary | ICD-10-CM

## 2024-09-12 RX ORDER — POTASSIUM CHLORIDE 750 MG/1
10 TABLET, EXTENDED RELEASE ORAL DAILY
Qty: 90 TABLET | Refills: 1 | Status: SHIPPED | OUTPATIENT
Start: 2024-09-12

## 2024-09-12 RX ORDER — HYDROCHLOROTHIAZIDE 12.5 MG/1
CAPSULE ORAL
Qty: 90 CAPSULE | Refills: 1 | Status: SHIPPED | OUTPATIENT
Start: 2024-09-12

## 2024-09-12 ASSESSMENT — ENCOUNTER SYMPTOMS
PHOTOPHOBIA: 0
BLOOD IN STOOL: 0
WHEEZING: 0
ABDOMINAL PAIN: 0
VOMITING: 0
NAUSEA: 0
SHORTNESS OF BREATH: 0
CONSTIPATION: 0

## 2024-10-28 ENCOUNTER — TELEPHONE (OUTPATIENT)
Dept: INTERNAL MEDICINE CLINIC | Age: 84
End: 2024-10-28

## 2024-10-28 NOTE — TELEPHONE ENCOUNTER
Patient calling after hours. C/o mucous and drainage in sinus. No cough. Asking for advice on what he can do. Does not use any nasal sprays. Pharmacy [facility] [address]. Advised he will get a call back Tuesday morning.

## 2024-10-29 NOTE — TELEPHONE ENCOUNTER
Please advise him to take Claritin 10 mg/day and Flonase nasal spray, 1 spray in both nostrils every day for total 2 weeks.  Will be happy to evaluate him in office or we can do a virtual visit.  Both medicine is over-the-counter

## 2024-10-29 NOTE — TELEPHONE ENCOUNTER
Patient informed and stated he will try the recommended medications and if that does not work he will call and make an appointment.

## 2024-10-30 ENCOUNTER — HOSPITAL ENCOUNTER (EMERGENCY)
Age: 84
Discharge: LEFT BEFORE DISCHARGE | End: 2024-10-30
Attending: EMERGENCY MEDICINE
Payer: MEDICARE

## 2024-10-30 VITALS
TEMPERATURE: 98 F | HEART RATE: 61 BPM | RESPIRATION RATE: 14 BRPM | OXYGEN SATURATION: 97 % | DIASTOLIC BLOOD PRESSURE: 70 MMHG | HEIGHT: 72 IN | BODY MASS INDEX: 22.89 KG/M2 | WEIGHT: 169 LBS | SYSTOLIC BLOOD PRESSURE: 143 MMHG

## 2024-10-30 DIAGNOSIS — Z53.21 ELOPED FROM EMERGENCY DEPARTMENT: Primary | ICD-10-CM

## 2024-10-30 DIAGNOSIS — R09.81 NASAL CONGESTION: ICD-10-CM

## 2024-10-30 LAB
FLUAV RNA RESP QL NAA+PROBE: NOT DETECTED
FLUBV RNA RESP QL NAA+PROBE: NOT DETECTED
SARS-COV-2 RNA RESP QL NAA+PROBE: NOT DETECTED

## 2024-10-30 PROCEDURE — 99283 EMERGENCY DEPT VISIT LOW MDM: CPT

## 2024-10-30 PROCEDURE — 87636 SARSCOV2 & INF A&B AMP PRB: CPT

## 2024-10-30 ASSESSMENT — PAIN - FUNCTIONAL ASSESSMENT: PAIN_FUNCTIONAL_ASSESSMENT: NONE - DENIES PAIN

## 2024-10-31 ASSESSMENT — ENCOUNTER SYMPTOMS
VOMITING: 0
SHORTNESS OF BREATH: 0
CHEST TIGHTNESS: 0
DIARRHEA: 0
NAUSEA: 0
ABDOMINAL PAIN: 0

## 2024-10-31 NOTE — ED PROVIDER NOTES
[facility] EMERGENCY DEPARTMENT  EMERGENCY DEPARTMENT ENCOUNTER        Pt Name: Del Young  MRN: [id number]  Birthdate 1940  Date of evaluation: 10/30/2024  Provider: RAFFI Fields - JOHNNY  PCP: DEBORAH Rios MD  Note Started: 1:14 AM EDT 10/31/24      OWEN. I have evaluated this patient.        CHIEF COMPLAINT       Chief Complaint   Patient presents with    Illness     Patient is having sinus drainage that started today.Never had it happen before        HISTORY OF PRESENT ILLNESS: 1 or more Elements     History from : Patient    Limitations to history : None    Del Young is a 84 y.o. male who presents to the emergency department with complaint of nasal congestion.  Onset of symptoms today    Denies any headache, fever, lightheadedness, dizziness, visual disturbances.  No chest pain or pressure.  No neck or back pain.  No shortness of breath, cough.  No abdominal pain, nausea, vomiting, diarrhea, constipation, or dysuria.  No rash.    Nursing Notes were all reviewed and agreed with or any disagreements were addressed in the HPI.    REVIEW OF SYSTEMS :      Review of Systems   Constitutional:  Negative for activity change, chills and fever.   HENT:  Positive for congestion.    Respiratory:  Negative for chest tightness and shortness of breath.    Cardiovascular:  Negative for chest pain.   Gastrointestinal:  Negative for abdominal pain, diarrhea, nausea and vomiting.   Genitourinary:  Negative for dysuria.   All other systems reviewed and are negative.      Positives and Pertinent negatives as per HPI.     SURGICAL HISTORY     Past Surgical History:   Procedure Laterality Date    PROSTATE SURGERY         CURRENTMEDICATIONS       Discharge Medication List as of 10/30/2024 10:56 PM        CONTINUE these medications which have NOT CHANGED    Details   potassium chloride (KLOR-CON M) 10 MEQ extended release tablet Take 1 tablet by mouth daily, Disp-90 tablet, R-1Normal     atraumatic.      Right Ear: Tympanic membrane and external ear normal.      Left Ear: Tympanic membrane and external ear normal.      Nose: Nose normal.      Mouth/Throat:      Mouth: Mucous membranes are moist.   Eyes:      General:         Right eye: No discharge.         Left eye: No discharge.   Neck:      Vascular: No JVD.   Cardiovascular:      Rate and Rhythm: Normal rate.      Pulses: Normal pulses.   Pulmonary:      Effort: Pulmonary effort is normal. No respiratory distress.   Abdominal:      Palpations: Abdomen is soft.   Musculoskeletal:         General: Normal range of motion.   Skin:     General: Skin is warm and dry.      Coloration: Skin is not pale.   Neurological:      Mental Status: He is alert.   Psychiatric:         Behavior: Behavior normal.             DIAGNOSTIC RESULTS   LABS:    Labs Reviewed   COVID-19 & INFLUENZA COMBO       When ordered only abnormal lab results are displayed. All other labs were within normal range or not returned as of this dictation.    EKG: When ordered, EKG's are interpreted by the Emergency Department Physician in the absence of a cardiologist.  Please see their note for interpretation of EKG.    RADIOLOGY:   Non-plain film images such as CT, Ultrasound and MRI are read by the radiologist. Plain radiographic images are visualized and preliminarily interpreted by the ED Provider with the below findings:        Interpretation per the Radiologist below, if available at the time of this note:    No orders to display     No results found.    No results found.    PROCEDURES   Unless otherwise noted below, none     Procedures    CRITICAL CARE TIME (.cctime)   none    PAST MEDICAL HISTORY      has a past medical history of DVT (deep venous thrombosis) (HCC), Dysuria, Hypertension, and Prostate enlargement.     Chronic Conditions affecting Care: none    EMERGENCY DEPARTMENT COURSE and DIFFERENTIAL DIAGNOSIS/MDM:   Vitals:    Vitals:    10/30/24 2148   BP: (!) 143/70  were completed with a voice recognition program.  Efforts were made to edit the dictations but occasionally words are mis-transcribed.)    RAFFI Fields CNP (electronically signed)           Kayli Farias APRN - CNP  10/31/24 0116

## 2024-10-31 NOTE — ED NOTES
Patient amish.    Patient stated, \"it is taking to long for my results of my Covid. I know I don't have Covid or the flu. You can call me with the results\"

## 2024-12-31 ENCOUNTER — TELEPHONE (OUTPATIENT)
Dept: INTERNAL MEDICINE CLINIC | Age: 84
End: 2024-12-31

## 2024-12-31 NOTE — TELEPHONE ENCOUNTER
Patient called on call provider with complaints on bilateral feet swelling for several days.  Wanted to schedule an appointment today to see Dr. Rios.  I explained to him the office was closed due to the holiday.      Patient denies SOB, CP, leg swelling or pain.  Has not been elevating his feet.  Does not know if his feet swelling goes down in the morning because he states he has not looked at them.     Patient was informed to go to the ED if he develops an of breath chest pain calf swelling,calf swelling, redness or pain.  Otherwise, call office on Thursday to make an appointment with his PCP.  Patient states he will elevate his feet for now.  He states if he develops any of the symptoms above he will go to ED.

## 2025-03-12 ENCOUNTER — OFFICE VISIT (OUTPATIENT)
Dept: INTERNAL MEDICINE CLINIC | Age: 85
End: 2025-03-12

## 2025-03-12 VITALS
SYSTOLIC BLOOD PRESSURE: 122 MMHG | HEIGHT: 72 IN | DIASTOLIC BLOOD PRESSURE: 70 MMHG | BODY MASS INDEX: 23.59 KG/M2 | HEART RATE: 64 BPM | OXYGEN SATURATION: 99 % | WEIGHT: 174.16 LBS

## 2025-03-12 VITALS
HEART RATE: 64 BPM | SYSTOLIC BLOOD PRESSURE: 122 MMHG | WEIGHT: 174.2 LBS | BODY MASS INDEX: 23.6 KG/M2 | OXYGEN SATURATION: 99 % | DIASTOLIC BLOOD PRESSURE: 70 MMHG | HEIGHT: 72 IN

## 2025-03-12 DIAGNOSIS — R60.0 BILATERAL LEG EDEMA: ICD-10-CM

## 2025-03-12 DIAGNOSIS — I10 ESSENTIAL HYPERTENSION: ICD-10-CM

## 2025-03-12 DIAGNOSIS — Z00.00 MEDICARE ANNUAL WELLNESS VISIT, SUBSEQUENT: Primary | ICD-10-CM

## 2025-03-12 DIAGNOSIS — F02.80 LATE ONSET ALZHEIMER'S DISEASE WITHOUT BEHAVIORAL DISTURBANCE (HCC): ICD-10-CM

## 2025-03-12 DIAGNOSIS — E11.9 TYPE 2 DIABETES MELLITUS WITHOUT COMPLICATION, WITHOUT LONG-TERM CURRENT USE OF INSULIN: ICD-10-CM

## 2025-03-12 DIAGNOSIS — H65.22 LEFT CHRONIC SEROUS OTITIS MEDIA: ICD-10-CM

## 2025-03-12 DIAGNOSIS — R51.9 NONINTRACTABLE HEADACHE, UNSPECIFIED CHRONICITY PATTERN, UNSPECIFIED HEADACHE TYPE: ICD-10-CM

## 2025-03-12 DIAGNOSIS — I10 ESSENTIAL HYPERTENSION: Primary | ICD-10-CM

## 2025-03-12 DIAGNOSIS — G30.1 LATE ONSET ALZHEIMER'S DISEASE WITHOUT BEHAVIORAL DISTURBANCE (HCC): ICD-10-CM

## 2025-03-12 DIAGNOSIS — K21.9 GASTROESOPHAGEAL REFLUX DISEASE WITHOUT ESOPHAGITIS: ICD-10-CM

## 2025-03-12 DIAGNOSIS — E11.9 TYPE 2 DIABETES MELLITUS WITHOUT COMPLICATION, WITHOUT LONG-TERM CURRENT USE OF INSULIN (HCC): ICD-10-CM

## 2025-03-12 LAB
ANION GAP SERPL CALCULATED.3IONS-SCNC: 9 MMOL/L (ref 3–16)
BUN SERPL-MCNC: 14 MG/DL (ref 7–20)
CALCIUM SERPL-MCNC: 9.7 MG/DL (ref 8.3–10.6)
CHLORIDE SERPL-SCNC: 96 MMOL/L (ref 99–110)
CHOLEST SERPL-MCNC: 168 MG/DL (ref 0–199)
CO2 SERPL-SCNC: 33 MMOL/L (ref 21–32)
CREAT SERPL-MCNC: 1 MG/DL (ref 0.8–1.3)
CREAT UR-MCNC: 62 MG/DL (ref 39–259)
CRP SERPL-MCNC: <3 MG/L (ref 0–5.1)
DEPRECATED RDW RBC AUTO: 13.4 % (ref 12.4–15.4)
ERYTHROCYTE [SEDIMENTATION RATE] IN BLOOD BY WESTERGREN METHOD: 15 MM/HR (ref 0–20)
EST. AVERAGE GLUCOSE BLD GHB EST-MCNC: 108.3 MG/DL
GFR SERPLBLD CREATININE-BSD FMLA CKD-EPI: 74 ML/MIN/{1.73_M2}
GLUCOSE SERPL-MCNC: 74 MG/DL (ref 70–99)
HBA1C MFR BLD: 5.4 %
HCT VFR BLD AUTO: 35.7 % (ref 40.5–52.5)
HDLC SERPL-MCNC: 74 MG/DL (ref 40–60)
HGB BLD-MCNC: 12 G/DL (ref 13.5–17.5)
LDLC SERPL CALC-MCNC: 86 MG/DL
MCH RBC QN AUTO: 31.1 PG (ref 26–34)
MCHC RBC AUTO-ENTMCNC: 33.5 G/DL (ref 31–36)
MCV RBC AUTO: 93 FL (ref 80–100)
MICROALBUMIN UR DL<=1MG/L-MCNC: <1.2 MG/DL
MICROALBUMIN/CREAT UR: NORMAL MG/G (ref 0–30)
PLATELET # BLD AUTO: 168 K/UL (ref 135–450)
PMV BLD AUTO: 8.4 FL (ref 5–10.5)
POTASSIUM SERPL-SCNC: 4 MMOL/L (ref 3.5–5.1)
RBC # BLD AUTO: 3.84 M/UL (ref 4.2–5.9)
SODIUM SERPL-SCNC: 138 MMOL/L (ref 136–145)
TRIGL SERPL-MCNC: 40 MG/DL (ref 0–150)
VLDLC SERPL CALC-MCNC: 8 MG/DL
WBC # BLD AUTO: 2.9 K/UL (ref 4–11)

## 2025-03-12 RX ORDER — POTASSIUM CHLORIDE 750 MG/1
10 TABLET, EXTENDED RELEASE ORAL DAILY
Qty: 90 TABLET | Refills: 1 | Status: SHIPPED | OUTPATIENT
Start: 2025-03-12

## 2025-03-12 RX ORDER — HYDROCHLOROTHIAZIDE 12.5 MG/1
CAPSULE ORAL
Qty: 90 CAPSULE | Refills: 1 | Status: SHIPPED | OUTPATIENT
Start: 2025-03-12

## 2025-03-12 SDOH — ECONOMIC STABILITY: FOOD INSECURITY: WITHIN THE PAST 12 MONTHS, THE FOOD YOU BOUGHT JUST DIDN'T LAST AND YOU DIDN'T HAVE MONEY TO GET MORE.: NEVER TRUE

## 2025-03-12 SDOH — ECONOMIC STABILITY: FOOD INSECURITY: WITHIN THE PAST 12 MONTHS, YOU WORRIED THAT YOUR FOOD WOULD RUN OUT BEFORE YOU GOT MONEY TO BUY MORE.: NEVER TRUE

## 2025-03-12 ASSESSMENT — ENCOUNTER SYMPTOMS
SHORTNESS OF BREATH: 0
TROUBLE SWALLOWING: 0
WHEEZING: 0
ABDOMINAL PAIN: 0
CONSTIPATION: 0
VOMITING: 0
BLOOD IN STOOL: 0
VOICE CHANGE: 0
PHOTOPHOBIA: 0
NAUSEA: 0

## 2025-03-12 ASSESSMENT — PATIENT HEALTH QUESTIONNAIRE - PHQ9
SUM OF ALL RESPONSES TO PHQ QUESTIONS 1-9: 0
2. FEELING DOWN, DEPRESSED OR HOPELESS: NOT AT ALL
SUM OF ALL RESPONSES TO PHQ QUESTIONS 1-9: 0
1. LITTLE INTEREST OR PLEASURE IN DOING THINGS: NOT AT ALL

## 2025-03-12 NOTE — PROGRESS NOTES
Delcarolina Young  1940  male  84 y.o.    SUBJECTIVE:    Chief Complaint   Patient presents with    6 Month Follow-Up     Patient is here for a 6 month follow up, patient states that he has had some minor headaches off and on. Patient has been using tylenol for the pain.       History of Present Illness  The patient is an 84-year-old male who presents for evaluation of headaches, hypertension, memory issues, and hand tremors.    He reports intermittent headaches, which he manages with daily Tylenol intake. He does not experience any associated symptoms such as nausea, vomiting, or visual disturbances. He also reports no history of falls or head trauma. He does not experience any jaw claudication or fatigue during mastication.    His blood pressure is well-controlled. He continues his regimen of hydrochlorothiazide and potassium supplements.    He acknowledges a decline in his memory but expresses reluctance to initiate any pharmacological interventions.    He has been observed to have hand tremors but declines medication for this symptom.    He does not take omeprazole for heartburn and ulcer. He does not feel any heartburn symptoms.    Supplemental Information  He had ear fullness and pain a couple of months ago, but he does not have it now.    MEDICATIONS  Current: Tylenol, hydrochlorothiazide, potassium    IMMUNIZATIONS  He declined pneumonia and influenza vaccines.         Past Medical History:   Diagnosis Date    DVT (deep venous thrombosis) (HCC)     Dysuria     Hypertension     Prostate enlargement      Past Surgical History:   Procedure Laterality Date    PROSTATE SURGERY       Social History     Socioeconomic History    Marital status:      Spouse name: None    Number of children: 3    Years of education: None    Highest education level: None   Occupational History    Occupation: retired    Tobacco Use    Smoking status: Former     Current packs/day: 0.00     Average packs/day: 2.0 packs/day

## 2025-03-12 NOTE — PROGRESS NOTES
Medicare Annual Wellness Visit    Del Young is here for Medicare AWV    Assessment & Plan   Medicare annual wellness visit, subsequent     Return in 1 year (on 3/12/2026) for Medicare AWV.     Subjective   Discuss with patient the importance of the recommended CARE GAPS of: COVID-19, DTAP, AND RSV vaccination. Patient states that he is not getting any vaccinations completed.  Order was placed for Diabetic Alb to Cr ratio.     Patient's complete Health Risk Assessment and screening values have been reviewed and are found in Flowsheets. The following problems were reviewed today and where indicated follow up appointments were made and/or referrals ordered.    Positive Risk Factor Screenings with Interventions:              Inactivity:  On average, how many days per week do you engage in moderate to strenuous exercise (like a brisk walk)?: 0 days (!) Abnormal  On average, how many minutes do you engage in exercise at this level?: 0 min  Interventions:  See AVS for additional education material  See A/P for plan and any pertinent orders        Vision Screen:  Do you have difficulty driving, watching TV, or doing any of your daily activities because of your eyesight?: No  Have you had an eye exam within the past year?: (!) No  Interventions:   See AVS for additional education material      Advanced Directives:  Do you have a Living Will?: (!) No (Information packet given)    Intervention:  has NO advanced directive - information provided           Objective   Vitals:    03/12/25 1035   BP: 122/70   Pulse: 64   SpO2: 99%   Weight: 79 kg (174 lb 2.6 oz)   Height: 1.829 m (6' 0.01\")      Body mass index is 23.62 kg/m².        Wt Readings from Last 3 Encounters:   03/12/25 79 kg (174 lb 2.6 oz)   03/12/25 79 kg (174 lb 3.2 oz)   10/30/24 76.7 kg (169 lb)     Temp Readings from Last 3 Encounters:   10/30/24 98 °F (36.7 °C) (Oral)   06/14/24 97.3 °F (36.3 °C) (Temporal)   07/13/21 97.8 °F (36.6 °C) (Temporal)     BP

## 2025-03-13 ENCOUNTER — RESULTS FOLLOW-UP (OUTPATIENT)
Dept: INTERNAL MEDICINE CLINIC | Age: 85
End: 2025-03-13

## 2025-03-13 DIAGNOSIS — D72.819 LEUKOPENIA, UNSPECIFIED TYPE: Primary | ICD-10-CM

## 2025-03-13 DIAGNOSIS — D50.8 OTHER IRON DEFICIENCY ANEMIA: ICD-10-CM

## 2025-03-13 NOTE — RESULT ENCOUNTER NOTE
Patient continued to have low white cell count as well as hemoglobin count worse than last visit.  Other labs are stable  Patient needs to reestablish with the hematologist.  Order placed

## 2025-07-31 ENCOUNTER — OFFICE VISIT (OUTPATIENT)
Dept: INTERNAL MEDICINE CLINIC | Age: 85
End: 2025-07-31

## 2025-07-31 VITALS
BODY MASS INDEX: 21.81 KG/M2 | WEIGHT: 161 LBS | HEIGHT: 72 IN | HEART RATE: 66 BPM | OXYGEN SATURATION: 99 % | SYSTOLIC BLOOD PRESSURE: 124 MMHG | DIASTOLIC BLOOD PRESSURE: 70 MMHG

## 2025-07-31 DIAGNOSIS — D51.8 OTHER VITAMIN B12 DEFICIENCY ANEMIA: ICD-10-CM

## 2025-07-31 DIAGNOSIS — R20.9 COLD HANDS: ICD-10-CM

## 2025-07-31 DIAGNOSIS — E11.9 TYPE 2 DIABETES MELLITUS WITHOUT COMPLICATION, WITHOUT LONG-TERM CURRENT USE OF INSULIN (HCC): ICD-10-CM

## 2025-07-31 DIAGNOSIS — I10 ESSENTIAL HYPERTENSION: ICD-10-CM

## 2025-07-31 DIAGNOSIS — R51.9 NONINTRACTABLE EPISODIC HEADACHE, UNSPECIFIED HEADACHE TYPE: ICD-10-CM

## 2025-07-31 DIAGNOSIS — R51.9 NONINTRACTABLE EPISODIC HEADACHE, UNSPECIFIED HEADACHE TYPE: Primary | ICD-10-CM

## 2025-07-31 ASSESSMENT — ENCOUNTER SYMPTOMS
CHEST TIGHTNESS: 0
WHEEZING: 0
TROUBLE SWALLOWING: 0
PHOTOPHOBIA: 0
ABDOMINAL PAIN: 0
CONSTIPATION: 0
SHORTNESS OF BREATH: 0
VOMITING: 0
NAUSEA: 0
BLOOD IN STOOL: 0
VOICE CHANGE: 0

## 2025-07-31 NOTE — PROGRESS NOTES
Del Benedictjuan  1940  male  85 y.o.    SUBJECTIVE:    Chief Complaint   Patient presents with    Follow-up     Patient is here for a 4 month follow up, patient complaints of headaches the past few months has been using tylenol to help manage. Patient also complains of coldness in feet and hands.       History of Present Illness  The patient is here for a follow-up visit, accompanied by his wife.    Experiencing headaches managed with daily Tylenol. No double vision, blurred vision, jaw fatigue, or pain during eating. Appetite good, no abdominal pain. No recent eye examination.    Occasional foot swelling and persistent coldness in hands and feet. No loss of strength, numbness, or burning sensations in hands.    Diagnosed with anemia, has a scheduled appointment with hematologist, continues vitamin B12 and B complex supplements.    On medication for hypertension.  Patient denies any exertional chest pain, dyspnea, palpitations, syncope, orthopnea, edema or paroxysmal nocturnal dyspnea.      Memory loss and excessive sleep.    History of H. pylori infection.    Sleep: Excessive sleepiness.         Past Medical History:   Diagnosis Date    DVT (deep venous thrombosis) (HCC)     Dysuria     Hypertension     Prostate enlargement      Past Surgical History:   Procedure Laterality Date    PROSTATE SURGERY       Social History     Socioeconomic History    Marital status:      Spouse name: None    Number of children: 3    Years of education: None    Highest education level: None   Occupational History    Occupation: retired    Tobacco Use    Smoking status: Former     Current packs/day: 0.00     Average packs/day: 2.0 packs/day for 12.0 years (24.0 ttl pk-yrs)     Types: Cigarettes     Start date: 1960     Quit date: 1972     Years since quittin.5    Smokeless tobacco: Never    Tobacco comments:     started to smoke at 19 / smoked up to 2 ppd    Vaping Use    Vaping status: Never Used

## 2025-08-01 LAB
ALBUMIN SERPL-MCNC: 4.1 G/DL (ref 3.4–5)
ALBUMIN/GLOB SERPL: 1.6 {RATIO} (ref 1.1–2.2)
ALP SERPL-CCNC: 30 U/L (ref 40–129)
ALT SERPL-CCNC: 25 U/L (ref 10–40)
ANION GAP SERPL CALCULATED.3IONS-SCNC: 8 MMOL/L (ref 3–16)
AST SERPL-CCNC: 24 U/L (ref 15–37)
BILIRUB SERPL-MCNC: 0.5 MG/DL (ref 0–1)
BUN SERPL-MCNC: 17 MG/DL (ref 7–20)
CALCIUM SERPL-MCNC: 9.7 MG/DL (ref 8.3–10.6)
CHLORIDE SERPL-SCNC: 96 MMOL/L (ref 99–110)
CO2 SERPL-SCNC: 31 MMOL/L (ref 21–32)
CREAT SERPL-MCNC: 1 MG/DL (ref 0.8–1.3)
CRP SERPL-MCNC: <3 MG/L (ref 0–5.1)
DEPRECATED RDW RBC AUTO: 13.3 % (ref 12.4–15.4)
ERYTHROCYTE [SEDIMENTATION RATE] IN BLOOD BY WESTERGREN METHOD: 8 MM/HR (ref 0–20)
EST. AVERAGE GLUCOSE BLD GHB EST-MCNC: 108.3 MG/DL
FOLATE SERPL-MCNC: 33.8 NG/ML (ref 4.78–24.2)
GFR SERPLBLD CREATININE-BSD FMLA CKD-EPI: 74 ML/MIN/{1.73_M2}
GLUCOSE SERPL-MCNC: 124 MG/DL (ref 70–99)
HBA1C MFR BLD: 5.4 %
HCT VFR BLD AUTO: 36.8 % (ref 40.5–52.5)
HGB BLD-MCNC: 12.3 G/DL (ref 13.5–17.5)
MCH RBC QN AUTO: 31.1 PG (ref 26–34)
MCHC RBC AUTO-ENTMCNC: 33.4 G/DL (ref 31–36)
MCV RBC AUTO: 93.2 FL (ref 80–100)
PLATELET # BLD AUTO: 171 K/UL (ref 135–450)
PMV BLD AUTO: 8.3 FL (ref 5–10.5)
POTASSIUM SERPL-SCNC: 4.1 MMOL/L (ref 3.5–5.1)
PROT SERPL-MCNC: 6.7 G/DL (ref 6.4–8.2)
RBC # BLD AUTO: 3.95 M/UL (ref 4.2–5.9)
SODIUM SERPL-SCNC: 135 MMOL/L (ref 136–145)
TSH SERPL DL<=0.005 MIU/L-ACNC: 0.43 UIU/ML (ref 0.27–4.2)
VIT B12 SERPL-MCNC: 1965 PG/ML (ref 211–911)
WBC # BLD AUTO: 2.8 K/UL (ref 4–11)

## 2025-08-03 LAB — COPPER SERPL-MCNC: 76.7 UG/DL (ref 70–140)

## 2025-08-05 LAB — VIT B1 SERPL-MCNC: 20 NMOL/L (ref 4–15)

## 2025-08-08 ENCOUNTER — HOSPITAL ENCOUNTER (EMERGENCY)
Age: 85
Discharge: LEFT BEFORE DISCHARGE | End: 2025-08-09
Payer: MEDICARE

## 2025-08-08 ENCOUNTER — APPOINTMENT (OUTPATIENT)
Dept: GENERAL RADIOLOGY | Age: 85
End: 2025-08-08
Payer: MEDICARE

## 2025-08-08 VITALS
HEIGHT: 72 IN | OXYGEN SATURATION: 99 % | TEMPERATURE: 98.1 F | SYSTOLIC BLOOD PRESSURE: 155 MMHG | HEART RATE: 60 BPM | BODY MASS INDEX: 21.98 KG/M2 | RESPIRATION RATE: 16 BRPM | WEIGHT: 162.3 LBS | DIASTOLIC BLOOD PRESSURE: 70 MMHG

## 2025-08-08 DIAGNOSIS — I88.9 LYMPHADENITIS: Primary | ICD-10-CM

## 2025-08-08 LAB
ALBUMIN SERPL-MCNC: 4.2 G/DL (ref 3.4–5)
ALBUMIN/GLOB SERPL: 1.2 {RATIO} (ref 1.1–2.2)
ALP SERPL-CCNC: 38 U/L (ref 40–129)
ALT SERPL-CCNC: 26 U/L (ref 10–40)
ANION GAP SERPL CALCULATED.3IONS-SCNC: 10 MMOL/L (ref 3–16)
AST SERPL-CCNC: 29 U/L (ref 15–37)
BASOPHILS # BLD: 0.1 K/UL (ref 0–0.2)
BASOPHILS NFR BLD: 1 %
BILIRUB SERPL-MCNC: 0.5 MG/DL (ref 0–1)
BUN SERPL-MCNC: 17 MG/DL (ref 7–20)
CALCIUM SERPL-MCNC: 9.9 MG/DL (ref 8.3–10.6)
CHLORIDE SERPL-SCNC: 97 MMOL/L (ref 99–110)
CO2 SERPL-SCNC: 29 MMOL/L (ref 21–32)
CREAT SERPL-MCNC: 0.9 MG/DL (ref 0.8–1.3)
DEPRECATED RDW RBC AUTO: 13.3 % (ref 12.4–15.4)
EOSINOPHIL # BLD: 0.2 K/UL (ref 0–0.6)
EOSINOPHIL NFR BLD: 3.2 %
GFR SERPLBLD CREATININE-BSD FMLA CKD-EPI: 84 ML/MIN/{1.73_M2}
GLUCOSE SERPL-MCNC: 127 MG/DL (ref 70–99)
HCT VFR BLD AUTO: 37.8 % (ref 40.5–52.5)
HGB BLD-MCNC: 12.6 G/DL (ref 13.5–17.5)
LYMPHOCYTES # BLD: 1.2 K/UL (ref 1–5.1)
LYMPHOCYTES NFR BLD: 22.4 %
MCH RBC QN AUTO: 31.1 PG (ref 26–34)
MCHC RBC AUTO-ENTMCNC: 33.4 G/DL (ref 31–36)
MCV RBC AUTO: 93.2 FL (ref 80–100)
MONOCYTES # BLD: 0.4 K/UL (ref 0–1.3)
MONOCYTES NFR BLD: 8.2 %
NEUTROPHILS # BLD: 3.4 K/UL (ref 1.7–7.7)
NEUTROPHILS NFR BLD: 65.2 %
PLATELET # BLD AUTO: 169 K/UL (ref 135–450)
PMV BLD AUTO: 7.3 FL (ref 5–10.5)
POTASSIUM SERPL-SCNC: 4.4 MMOL/L (ref 3.5–5.1)
PROT SERPL-MCNC: 7.6 G/DL (ref 6.4–8.2)
RBC # BLD AUTO: 4.05 M/UL (ref 4.2–5.9)
SODIUM SERPL-SCNC: 136 MMOL/L (ref 136–145)
WBC # BLD AUTO: 5.2 K/UL (ref 4–11)

## 2025-08-08 PROCEDURE — 71046 X-RAY EXAM CHEST 2 VIEWS: CPT

## 2025-08-08 PROCEDURE — 99284 EMERGENCY DEPT VISIT MOD MDM: CPT

## 2025-08-08 PROCEDURE — 85025 COMPLETE CBC W/AUTO DIFF WBC: CPT

## 2025-08-08 PROCEDURE — 80053 COMPREHEN METABOLIC PANEL: CPT

## 2025-08-08 PROCEDURE — 36415 COLL VENOUS BLD VENIPUNCTURE: CPT

## 2025-08-08 PROCEDURE — 6370000000 HC RX 637 (ALT 250 FOR IP): Performed by: PHYSICIAN ASSISTANT

## 2025-08-08 RX ORDER — CEPHALEXIN 500 MG/1
500 CAPSULE ORAL 2 TIMES DAILY
Qty: 14 CAPSULE | Refills: 0 | Status: SHIPPED | OUTPATIENT
Start: 2025-08-08 | End: 2025-08-15

## 2025-08-08 RX ORDER — ACETAMINOPHEN 500 MG
1000 TABLET ORAL ONCE
Status: COMPLETED | OUTPATIENT
Start: 2025-08-08 | End: 2025-08-08

## 2025-08-08 RX ADMIN — ACETAMINOPHEN 1000 MG: 500 TABLET ORAL at 21:25

## 2025-08-08 ASSESSMENT — PAIN - FUNCTIONAL ASSESSMENT: PAIN_FUNCTIONAL_ASSESSMENT: 0-10

## 2025-08-08 ASSESSMENT — ENCOUNTER SYMPTOMS
DIARRHEA: 0
SHORTNESS OF BREATH: 0
NAUSEA: 0
RHINORRHEA: 0
COUGH: 1
ABDOMINAL PAIN: 0
VOMITING: 0

## 2025-08-08 ASSESSMENT — PAIN SCALES - GENERAL
PAINLEVEL_OUTOF10: 5
PAINLEVEL_OUTOF10: 5

## 2025-08-29 ENCOUNTER — HOSPITAL ENCOUNTER (OUTPATIENT)
Dept: MRI IMAGING | Age: 85
Discharge: HOME | End: 2025-08-29
Attending: INTERNAL MEDICINE

## 2025-08-29 DIAGNOSIS — R51.9 NONINTRACTABLE EPISODIC HEADACHE, UNSPECIFIED HEADACHE TYPE: ICD-10-CM
